# Patient Record
Sex: FEMALE | Race: WHITE | Employment: UNEMPLOYED | URBAN - METROPOLITAN AREA
[De-identification: names, ages, dates, MRNs, and addresses within clinical notes are randomized per-mention and may not be internally consistent; named-entity substitution may affect disease eponyms.]

---

## 2017-01-11 ENCOUNTER — OFFICE VISIT (OUTPATIENT)
Dept: ENDOCRINOLOGY | Age: 52
End: 2017-01-11

## 2017-01-11 VITALS
WEIGHT: 242.8 LBS | SYSTOLIC BLOOD PRESSURE: 145 MMHG | HEIGHT: 66 IN | HEART RATE: 94 BPM | DIASTOLIC BLOOD PRESSURE: 88 MMHG | BODY MASS INDEX: 39.02 KG/M2

## 2017-01-11 DIAGNOSIS — E03.9 HYPOTHYROIDISM (ACQUIRED): ICD-10-CM

## 2017-01-11 DIAGNOSIS — D50.9 IRON DEFICIENCY ANEMIA, UNSPECIFIED IRON DEFICIENCY ANEMIA TYPE: ICD-10-CM

## 2017-01-11 DIAGNOSIS — R82.994 HYPERCALCIURIA: ICD-10-CM

## 2017-01-11 DIAGNOSIS — M85.80 OSTEOPENIA: Primary | ICD-10-CM

## 2017-01-11 NOTE — PROGRESS NOTES
Chief Complaint   Patient presents with    Other     h/o stress fractures    Other     pcp and pharmacy confirmed     History of Present Illness: Basilio Fernández is a 45 y.o. female who is a new patient for stress fractures. Was referred by Dr. Consuelo Corcoran. Her mother is Julien Canchola who is a patient of mine. Has a history of seizures that started at age 14 and was put on dilantin at that time and continued until age 20 when she had depakote added and continued on both meds until a few years ago when she was taken off the dilantin but does still remain on depakote. Hasn't had a seizure in 28 years but she and Dr. Mcmillan have just stayed on this because at this point it's keeping her seizure free and she isn't having side effects. Started going through perimenopause around age 53-51 and was found to have severe anemia and hypothyroidism. She was having a lot of hair loss at the time. She was started on iron and levothyroxine at that time and had continued taking both of these meds until 2-3 weeks ago decided to stop the iron supplements but had only been taking them 2x/week recently. Her Hgb was 14 in 6/16 while on this. Started noticing that it felt like the bones in her feet felt like they were being crushed and saw Dr. Víctor De La Rosa and was found to have a stress fracture in her right foot and wore a boot for a month but then had another fracture after this. Never needed surgery but stopped wearing the boot as it was causing knee pain. Has not had any other recurrence of stress fractures but thinks her left foot could have suffered a fracture but she hit this against the wall and her toes turned purple and black doing this on [place] eve but hasn't had this x-rayed. No other fractures in any other part of her body.   Was taking calcium supplements 1 tab bid for a few years until she had a 24 hour urine done by Dr. Víctor De La Rosa in 10/16 that showed her value was high at 495 (100-300) and decided to stop the calcium supplements at that time. Was at one point taking a mvi intermittently but never consistently but not on this currently. Does take 1000 of vitamin D3 daily. Her vitamin D was normal at 38 in 10/16 and her PTH was normal at 30. Recalls having a bone density scan at Dr. Phyllis Gonzalez a few years ago when she was having these fractures and was told she had osteopenia. I requested this record. States Dr. Glenn Hampton put her on once a week alendronate and she thinks its the 70 mg dose but will confirm. Hasn't had a repeat scan on treatment. Takes this properly at least 60 min before food with just water and doesn't lie down for 30 min. Has gotten this faithfully every week since starting but may not always get the same day each week. No GERD or dysphagia or nausea. Does sometimes get some bone pain in the shins that happens 1-2 times a week and then none for a month. Quit smoking 14 years ago but does have a 30 pack year history. No ETOH. May need a course of prednisone 10 mg tabs for 5 days 1-2 times a year for her sinus disease. No PPI. Her TSH was 2.019 in . Has high cholesterol and was told she had some blockages on a heart scan by Dr. Rekha Luque and was recommended to take lipitor but she didn't want to due to concern for side effects. Her weight is up about 50 lbs over the past 4 years despite the same dose of depakote and this coincides with menopause but is only up 8 lbs since 3/16 at Dr. Aamir Aden office. Tends to be hot natured. Bowel are regular. Some hair loss and brittle nails. Some dry skin. Energy is 5/10.     Past Medical History   Diagnosis Date    Arthritis     Hyperlipidemia     Hypothyroidism (acquired)     Iron deficiency anemia     Seizures (HCC)     Unspecified epilepsy without mention of intractable epilepsy ([facility] [address]      Past Surgical History   Procedure Laterality Date    Hx  section       Current Outpatient Prescriptions   Medication Sig    BIOTIN, BULK, by Does Not Apply route daily.  divalproex DR (DEPAKOTE) 250 mg tablet Take 3 tabs in morning, 2 in the afternoon, and 3 tabs at night    clonazePAM (KLONOPIN) 0.5 mg tablet Take 1 Tab by mouth every eight (8) hours as needed (seizure).  alendronate (FOSAMAX) 70 mg tablet Take 70 mg by mouth every Wednesday.  Cholecalciferol, Vitamin D3, (VITAMIN D3) 1,000 unit cap Take 1,000 Units by mouth daily.  levothyroxine (SYNTHROID) 75 mcg tablet Take  by mouth Daily (before breakfast). No current facility-administered medications for this visit. Allergies   Allergen Reactions    Macrobid [Nitrofurantoin Monohyd/M-Cryst] Other (comments)     headaches     Family History   Problem Relation Age of Onset    Other Mother      fibromyalgia, sarcoidosis    Thyroid Disease Mother      hypothyroidism    Other Father 27     car accident    Heart Disease Maternal Grandmother     Cancer Maternal Grandmother      kidney    Cancer Maternal Grandfather      colon     Social History     Social History    Marital status:      Spouse name: N/A    Number of children: N/A    Years of education: N/A     Occupational History    Not on file. Social History Main Topics    Smoking status: Former Smoker     Packs/day: 1.50     Years: 19.00     Quit date: 1/11/2003    Smokeless tobacco: Not on file    Alcohol use No    Drug use: No    Sexual activity: Not on file     Other Topics Concern    Not on file     Social History Narrative    Lives in the [address] [place] [address] with . Has a grown son. Not currently working. Used to work as a .  Has a dog who is ill that she cares for.      Review of Systems:  - Constitutional Symptoms: no fevers, chills, (+) weight gain as above  - Eyes: no blurry vision or double vision  - Cardiovascular: no chest pain or palpitations  - Respiratory: no cough or shortness of breath aside from with a cold  - Gastrointestinal: no dysphagia or abdominal pain  - Musculoskeletal: (+) joint pains and shin pains  - Integumentary: no rashes  - Neurological: rare numbness, tingling, occ sinus headaches  - Psychiatric: no depression, some anxiety  - Endocrine: (+) heat intolerance, no polyuria or polydipsia    Physical Examination:  Blood pressure 145/88, pulse 94, height 5' 6\" (1.676 m), weight 242 lb 12.8 oz (110.1 kg). - General: pleasant, no distress, good eye contact  - HEENT: no exopthalmos, no periorbital edema, no scleral/conjunctival injection, EOMI, no lid lag or stare  - Neck: supple, small goiter, no masses, lymph nodes, or carotid bruits, no supraclavicular or dorsocervical fat pads  - Cardiovascular: regular, normal rate, normal S1 and S2, no murmurs/rubs/gallops   - Respiratory: clear to auscultation bilaterally  - Gastrointestinal: soft, nontender, nondistended, no masses, no hepatosplenomegaly  - Musculoskeletal: no proximal muscle weakness in upper or lower extremities  - Integumentary: no acanthosis nigricans, no abdominal striae, no rashes, no edema  - Neurological: reflexes 2+ at biceps, no tremor  - Psychiatric: normal mood and affect    Data Reviewed:   - labs as above      Assessment/Plan:   1. Osteopenia: She was apparently diagnosed with this a few years ago based on DXA at Dr. Marian Bowden office and started developing stress fractures in her feet. I have requested her scan for review. She has risk factors for low bone density of an almost 30 pack year history of smoking, chronic anti-seizure meds with dilantin and depakote, intermittent courses of prednisone 1-2 times a year for sinus issues and also being in a post-menopausal state. She was started on alendronate 70 mg weekly by PCP and has not had a repeat DXA to see the effect of this but has not had any recurrence of fractures. - obtain old DXA and likely plan for repeat DXA this spring  - cont alendronate 70 mg weekly  - check CBC w/o diff and cmp for other causes of bone loss      2. Hypothyroidism (acquired): TSH 2.019 in 6/16 on levothyroxine 75 mcg daily but has gained 8 lbs since then. - cont levothyroxine 75 mcg daily until labs are back  - check TSH and free T4 today      3. Hypercalciuria: was found to have a 24 hour urine calcium of 495 in 10/16 by Dr. Pedro Gil. She decided to stop her calcium supplements at that time. Will repeat her 24 hour urine now that she is off the supplements to see if she is wasting calcium as another potential cause of low bone density and if so will treat with hctz. - collect 24 hour urine for calcium now      4. Iron deficiency anemia, unspecified iron deficiency anemia type: Hgb was 14 in 6/16 while taking iron 2x/week and stopped a few weeks ago. - check CBC w/o diff now      Patient Instructions   1) Please call [phone] to reset your [employer] password. Look at your bottle of alendronate and if it's not the 70 mg dose, let me know. 2) I will obtain your bone density scan for review and likely we will arrange for a repeat scan to see the effect of the alendronate over the past few years. 3) Your 24 hour urine calcium was high in 10/16 but you were taking calcium supplements at that time. Now that you are off them, I would like to repeat this to see if you truly have a condition called hypercalciuria where your body can abnormally waste calcium in the urine and make your bones weaker. Treatment is using a pill called hctz that helps your kidneys reabsorb calcium. To collect the 24 hour urine properly, the morning you start the collection, let the first void go into the toilet and then collect every time you go over the next 24 hours and keep the jug in the refrigerator. The next morning, collect the first void and this will complete your sample and then bring the jug to the lab that day. 4) I will repeat your thyroid tests to see if you need a change in your dose of levothyroxine.     5) I will repeat your hemoglobin off the iron to make sure this is still normal.    6) I will send you a message through [employer] with your lab results to determine a plan. Follow-up Disposition:  Return for to be determined based on lab results. Copy sent to:  Dr. Kandy Moraes via [facility]  Dr. Kathrin Suresh    Lab follow up: 1/14/17  Component      Latest Ref Rng & Units 1/11/2017 1/11/2017 1/11/2017 1/11/2017           1:28 PM  1:28 PM  1:28 PM  1:28 PM   Glucose      65 - 99 mg/dL   81    BUN      6 - 24 mg/dL   17    Creatinine      0.57 - 1.00 mg/dL   0.57    GFR est non-AA      >59 mL/min/1.73   107    GFR est AA      >59 mL/min/1.73   123    BUN/Creatinine ratio      9 - 23   30 (H)    Sodium      134 - 144 mmol/L   141    Potassium      3.5 - 5.2 mmol/L   4.0    Chloride      96 - 106 mmol/L   100    CO2      18 - 29 mmol/L   23    Calcium      8.7 - 10.2 mg/dL   9.5    Protein, total      6.0 - 8.5 g/dL   7.3    Albumin      3.5 - 5.5 g/dL   4.2    GLOBULIN, TOTAL      1.5 - 4.5 g/dL   3.1    A-G Ratio      1.1 - 2.5   1.4    Bilirubin, total      0.0 - 1.2 mg/dL   0.3    Alk. phosphatase      39 - 117 IU/L   69    AST      0 - 40 IU/L   43 (H)    ALT      0 - 32 IU/L   22    WBC      3.4 - 10.8 x10E3/uL    8.4   RBC      3.77 - 5.28 x10E6/uL    4.25   HGB      11.1 - 15.9 g/dL    14.1   HCT      34.0 - 46.6 %    41.4   MCV      79 - 97 fL    97   MCH      26.6 - 33.0 pg    33.2 (H)   MCHC      31.5 - 35.7 g/dL    34.1   RDW      12.3 - 15.4 %    13.3   PLATELET      901 - 928 x10E3/uL    239   T4, Free      0.82 - 1.77 ng/dL  1.20     TSH      0.450 - 4.500 uIU/mL 2.790        Sent her the following message through [employer]:  TSH is a thyroid test.  Your level is 2.79 which is normal but slightly above goal of 0.5-2.0. This test goes opposite of your thyroid dose and suggests your dose of levothyroxine is not enough.   I will increase your dose to 88 mcg daily and have sent a new prescription to your local pharmacy.  ======  Your CBC (complete blood count) including your hemoglobin (red blood cell count) was normal so you have no evidence of anemia and it's fine to stay off iron at this point.  ======  BUN and creatinine are markers of kidney function. Your values are normal.  ======  Your electrolytes (sodium, potassium, and calcium) are all normal.  Your glucose (blood sugar) is normal.  ======  ALT and AST are markers of liver function. Your AST is just barely abnormal and I'm not concerned about this.  =======  I received your bone density report from November 2012 that showed your T-score (your bone density compared to a 27year old) at your lumbar spine was -1.2 which was consistent with osteopenia (mild bone loss), -0.4 at the left total hip which was normal and -1.4 at the left femoral neck which was also consistent with osteopenia. I will await the results of your 24 hour urine to determine a plan of what to do with your alendronate but I think we should repeat your next bone density now so we can see your progress over the past 4 years. I will send an order to [facility] on Monday and will have [facility] call you to set this up. Lab follow up: 1/15/17  Component      Latest Ref Rng & Units 1/13/2017           7:15 AM   Calcium,urine mg/dL      Not Estab. mg/dL 24.1   Calcium mg/24 hr      100.0 - 300.0 mg/24 hr 361.5 (H)     Sent her the following message through [employer]:  Your 24 hour urine is still elevated at 361 though down from 495 in October. It's possible that you are losing calcium in the urine as a reason for your weaker bones and I think you would benefit from starting a low dose of hctz 12.5 mg once daily in the morning to help you reabsorb calcium from the urine. This will be ready for  at the pharmacy today.   After we get your repeat bone density scan results back, I'll be in touch with a final plan about what to do with the alendronate and when to come back and see me.    Addendum: 1/31/17    Received her DXA results done on 1/26/17 that showed the following:  - L-spine: T-score -1.0 (2.3% increase since 2012)  - left total hip: T-score -0.1 (3% increase since 2012)  - left femoral neck: T-score -0.9 (8.6% increase since 2012)    Sent her the following message through [id number] and called her with results and a plan:    1) Your bone density scan shows that your spine and both your left total hip and left femoral neck T-scores are all -1.0 or greater which means that all 3 sites are currently in the normal range and you no longer have any evidence of osteopenia (mild bone loss) like you did in 2010. You have had a 2.3% increase in bone density at your spine, a 3% increase at your left total hip and an 8.6% increase at your left femoral neck. All 3 of these changes are considered statistically significant. I will mail you a copy of your bone density report. 2) Because your bone density is now in the normal range after 4 years of treatment with alendronate, I would like to stop the alendronate at this time and see how your bone density does over the next 2 years without any medication. There is a risk of atypical fractures of the leg bone with prolonged treatment with alendronate and given your bone density is now in the normal range, I think the potential risk of continuing treatment outweighs the potential benefit. 3) Please continue taking hctz 12.5 mg daily. I will have the office call you to arrange a follow up appointment in 4 months and will ask that you repeat a 24 hour urine for calcium the week prior to the visit so we can see the effect of this medication. I will mail you a lab slip about 3-4 weeks before your next visit to have your urine repeated the week prior to your next visit.

## 2017-01-11 NOTE — PATIENT INSTRUCTIONS
1) Please call [phone] to reset your [employer] password. Look at your bottle of alendronate and if it's not the 70 mg dose, let me know. 2) I will obtain your bone density scan for review and likely we will arrange for a repeat scan to see the effect of the alendronate over the past few years. 3) Your 24 hour urine calcium was high in 10/16 but you were taking calcium supplements at that time. Now that you are off them, I would like to repeat this to see if you truly have a condition called hypercalciuria where your body can abnormally waste calcium in the urine and make your bones weaker. Treatment is using a pill called hctz that helps your kidneys reabsorb calcium. To collect the 24 hour urine properly, the morning you start the collection, let the first void go into the toilet and then collect every time you go over the next 24 hours and keep the jug in the refrigerator. The next morning, collect the first void and this will complete your sample and then bring the jug to the lab that day. 4) I will repeat your thyroid tests to see if you need a change in your dose of levothyroxine. 5) I will repeat your hemoglobin off the iron to make sure this is still normal.    6) I will send you a message through [employer] with your lab results to determine a plan.

## 2017-01-11 NOTE — MR AVS SNAPSHOT
Visit Information Date & Time Provider Department Dept. Phone Encounter #  
 1/11/2017 10:50 AM Tello Rojas MD [facility] [id number] Follow-up Instructions Return for to be determined based on lab results. Upcoming Health Maintenance Date Due Hepatitis C Screening 1965 DTaP/Tdap/Td series (1 - Tdap) 5/20/1985 PAP AKA CERVICAL CYTOLOGY 5/20/1985 BREAST CANCER SCRN MAMMOGRAM 5/20/2014 FOBT Q 1 YEAR AGE 50-75 5/20/2014 INFLUENZA AGE 9 TO ADULT 8/1/2016 Allergies as of 1/11/2017  Review Complete On: 1/11/2017 By: Tello Rojas MD  
  
 Severity Noted Reaction Type Reactions Macrobid [Nitrofurantoin Monohyd/m-cryst]  05/06/2013   Intolerance Other (comments)  
 headaches Current Immunizations  Never Reviewed No immunizations on file. Not reviewed this visit You Were Diagnosed With   
  
 Codes Comments Osteopenia    -  Primary ICD-10-CM: M85.80 ICD-9-CM: 733.90 Hypothyroidism (acquired)     ICD-10-CM: E03.9 ICD-9-CM: 244.9 Hypercalciuria     ICD-10-CM: E83.50 ICD-9-CM: 275.40 Iron deficiency anemia, unspecified iron deficiency anemia type     ICD-10-CM: D50.9 ICD-9-CM: 280.9 Vitals BP Pulse Height(growth percentile) Weight(growth percentile) BMI OB Status 145/88 (BP 1 Location: Left arm, BP Patient Position: Sitting) 94 5' 6\" (1.676 m) 242 lb 12.8 oz (110.1 kg) 39.19 kg/m2 Menopause Smoking Status Former Smoker Vitals History BMI and BSA Data Body Mass Index Body Surface Area  
 39.19 kg/m 2 2.26 m 2 Preferred Pharmacy Pharmacy Name Phone [facility] [address] [facility] [place], [address] [phone] Your Updated Medication List  
  
   
This list is accurate as of: 1/11/17 12:41 PM.  Always use your most recent med list.  
  
  
  
  
 alendronate 70 mg tablet Commonly known as:  FOSAMAX Take 70 mg by mouth every Wednesday. BIOTIN (BULK)  
by Does Not Apply route daily. clonazePAM 0.5 mg tablet Commonly known as:  Nica Donald Take 1 Tab by mouth every eight (8) hours as needed (seizure). divalproex  mg tablet Commonly known as:  DEPAKOTE Take 3 tabs in morning, 2 in the afternoon, and 3 tabs at night SYNTHROID 75 mcg tablet Generic drug:  levothyroxine Take  by mouth Daily (before breakfast). VITAMIN D3 1,000 unit Cap Generic drug:  cholecalciferol Take 1,000 Units by mouth daily. We Performed the Following CALCIUM, UR, 24 HR [82260 CPT(R)] CBC W/O DIFF [89257 CPT(R)] METABOLIC PANEL, COMPREHENSIVE [80165 CPT(R)] T4, FREE [id number] CPT(R)] TSH 3RD GENERATION [53016 CPT(R)] Follow-up Instructions Return for to be determined based on lab results. Patient Instructions 1) Please call [phone] to reset your [employer] password. Look at your bottle of alendronate and if it's not the 70 mg dose, let me know. 2) I will obtain your bone density scan for review and likely we will arrange for a repeat scan to see the effect of the alendronate over the past few years. 3) Your 24 hour urine calcium was high in 10/16 but you were taking calcium supplements at that time. Now that you are off them, I would like to repeat this to see if you truly have a condition called hypercalciuria where your body can abnormally waste calcium in the urine and make your bones weaker. Treatment is using a pill called hctz that helps your kidneys reabsorb calcium. To collect the 24 hour urine properly, the morning you start the collection, let the first void go into the toilet and then collect every time you go over the next 24 hours and keep the jug in the refrigerator. The next morning, collect the first void and this will complete your sample and then bring the jug to the lab that day. 4) I will repeat your thyroid tests to see if you need a change in your dose of levothyroxine. 5) I will repeat your hemoglobin off the iron to make sure this is still normal. 
 
6) I will send you a message through [employer] with your lab results to determine a plan. Introducing [facility]! Dear Hannah Morales: Thank you for requesting a [employer] account. Our records indicate that you already have an active [employer] account. You can access your account anytime at [email or web address] Did you know that you can access your hospital and ER discharge instructions at any time in [employer]? You can also review all of your test results from your hospital stay or ER visit. Additional Information If you have questions, please visit the Frequently Asked Questions section of the [employer] website at [email or web address] Remember, [employer] is NOT to be used for urgent needs. For medical emergencies, dial 911. Now available from your iPhone and Android! Please provide this summary of care documentation to your next provider. Your primary care clinician is listed as Christian Hughes. If you have any questions after today's visit, please call [phone].

## 2017-01-12 ENCOUNTER — TELEPHONE (OUTPATIENT)
Dept: ENDOCRINOLOGY | Age: 52
End: 2017-01-12

## 2017-01-12 LAB
ALBUMIN SERPL-MCNC: 4.2 G/DL (ref 3.5–5.5)
ALBUMIN/GLOB SERPL: 1.4 {RATIO} (ref 1.1–2.5)
ALP SERPL-CCNC: 69 IU/L (ref 39–117)
ALT SERPL-CCNC: 22 IU/L (ref 0–32)
AST SERPL-CCNC: 43 IU/L (ref 0–40)
BILIRUB SERPL-MCNC: 0.3 MG/DL (ref 0–1.2)
BUN SERPL-MCNC: 17 MG/DL (ref 6–24)
BUN/CREAT SERPL: 30 (ref 9–23)
CALCIUM SERPL-MCNC: 9.5 MG/DL (ref 8.7–10.2)
CHLORIDE SERPL-SCNC: 100 MMOL/L (ref 96–106)
CO2 SERPL-SCNC: 23 MMOL/L (ref 18–29)
CREAT SERPL-MCNC: 0.57 MG/DL (ref 0.57–1)
ERYTHROCYTE [DISTWIDTH] IN BLOOD BY AUTOMATED COUNT: 13.3 % (ref 12.3–15.4)
GLOBULIN SER CALC-MCNC: 3.1 G/DL (ref 1.5–4.5)
GLUCOSE SERPL-MCNC: 81 MG/DL (ref 65–99)
HCT VFR BLD AUTO: 41.4 % (ref 34–46.6)
HGB BLD-MCNC: 14.1 G/DL (ref 11.1–15.9)
MCH RBC QN AUTO: 33.2 PG (ref 26.6–33)
MCHC RBC AUTO-ENTMCNC: 34.1 G/DL (ref 31.5–35.7)
MCV RBC AUTO: 97 FL (ref 79–97)
PLATELET # BLD AUTO: 239 X10E3/UL (ref 150–379)
POTASSIUM SERPL-SCNC: 4 MMOL/L (ref 3.5–5.2)
PROT SERPL-MCNC: 7.3 G/DL (ref 6–8.5)
RBC # BLD AUTO: 4.25 X10E6/UL (ref 3.77–5.28)
SODIUM SERPL-SCNC: 141 MMOL/L (ref 134–144)
T4 FREE SERPL-MCNC: 1.2 NG/DL (ref 0.82–1.77)
TSH SERPL DL<=0.005 MIU/L-ACNC: 2.79 UIU/ML (ref 0.45–4.5)
WBC # BLD AUTO: 8.4 X10E3/UL (ref 3.4–10.8)

## 2017-01-12 NOTE — TELEPHONE ENCOUNTER
----- Message from Kandi Camejo sent at 1/12/2017  9:50 AM EST -----  Regarding: lab slip  Contact: [phone]  1/12/2017  9:50 AM      Ms. Townsend called stating that she have questions regarding her lab slip, please call [phone] when you have a moment. Thanks   -----      Pt called and was concerned about the different dates on her Health Maint. She stated that they were not dated right. I informed her of the things we handled as a specialist but informed her that the other items are done according to her pcp or a provider who is treating in that particular area within [facility]. I informed her that I would let Dr. Jerson Mcneal know. She stated that she is being treated for a stress fracture.

## 2017-01-14 RX ORDER — LEVOTHYROXINE SODIUM 88 UG/1
88 TABLET ORAL
Qty: 90 TAB | Refills: 3 | Status: SHIPPED | OUTPATIENT
Start: 2017-01-14 | End: 2017-11-17 | Stop reason: ALTCHOICE

## 2017-01-15 LAB
CALCIUM 24H UR-MCNC: 24.1 MG/DL
CALCIUM 24H UR-MRATE: 361.5 MG/24 HR (ref 100–300)

## 2017-01-15 RX ORDER — HYDROCHLOROTHIAZIDE 12.5 MG/1
12.5 TABLET ORAL DAILY
Qty: 30 TAB | Refills: 11 | Status: SHIPPED | OUTPATIENT
Start: 2017-01-15 | End: 2017-05-12 | Stop reason: SDUPTHER

## 2017-01-16 ENCOUNTER — TELEPHONE (OUTPATIENT)
Dept: ENDOCRINOLOGY | Age: 52
End: 2017-01-16

## 2017-01-16 NOTE — TELEPHONE ENCOUNTER
----- Message from Amanda Vee MD sent at 1/16/2017  8:09 AM EST -----  Please call pt to let her know she has an unread message in [address] TSH is a thyroid test.  Your level is 2.79 which is normal but slightly above goal of 0.5-2.0.  This test goes opposite of your thyroid dose and suggests your dose of levothyroxine is not enough.  I will increase your dose to 88 mcg daily and have sent a new prescription to your local pharmacy.   ======   Your CBC (complete blood count) including your hemoglobin (red blood cell count) was normal so you have no evidence of anemia and it's fine to stay off iron at this point.   ======   BUN and creatinine are markers of kidney function.  Your values are normal.   ======   Your electrolytes (sodium, potassium, and calcium) are all normal.  Your glucose (blood sugar) is normal.   ======   ALT and AST are markers of liver function.  Your AST is just barely abnormal and I'm not concerned about this.   =======   I received your bone density report from November 2012 that showed your T-score (your bone density compared to a 27year old) at your lumbar spine was -1.2 which was consistent with osteopenia (mild bone loss), -0.4 at the left total hip which was normal and -1.4 at the left femoral neck which was also consistent with osteopenia.  I will await the results of your 24 hour urine to determine a plan of what to do with your alendronate but I think we should repeat your next bone density now so we can see your progress over the past 4 years. Jordana Hogan will send an order to [facility] on Monday and will have [facility] call you to set this up.   ========  Your 24 hour urine is still elevated at 361 though down from 495 in October.  It's possible that you are losing calcium in the urine as a reason for your weaker bones and I think you would benefit from starting a low dose of hctz 12.5 mg once daily in the morning to help you reabsorb calcium from the urine.  This will be ready for  at the pharmacy today. Lauren Bejarano we get your repeat bone density scan results back, I'll be in touch with a final plan about what to do with the alendronate and when to come back and see me.   -----    Pt stated that she read the message noted above.

## 2017-01-19 ENCOUNTER — TELEPHONE (OUTPATIENT)
Dept: ENDOCRINOLOGY | Age: 52
End: 2017-01-19

## 2017-01-19 NOTE — TELEPHONE ENCOUNTER
----- Message from Mildred Partida MD sent at 1/16/2017  8:11 AM EST -----      ----- Message -----     From: Mildred Partida MD     Sent: 1/16/2017       To: Mildred Partida MD    [facility]

## 2017-01-19 NOTE — TELEPHONE ENCOUNTER
----- Message from Meera Helms MD sent at 1/16/2017  8:11 AM EST -----      ----- Message -----     From: Meera Helms MD     Sent: 1/16/2017       To: Meera Helms MD    Arrange for bone density scan at [facility]  -------      [facility]  Female, 45 y.o., 1965  Weight:   242 lb 12.8 oz (110.1 kg)  Phone:   [phone]  PCP:   Lubna Coreas MD  MRN:   [id number]  MyChart: Active  Next Appt:   None    Message  Received: 3 days ago       [facility] Helms MD  [facility]                       Previous Messages       ----- Message -----      From: Meera Helms MD      Sent: 1/16/2017        To: Meera Helms MD     Arrange for bone density scan at [facility]                     Comments        Appointment scheduled for jan 26 at 3:30  No calcium the day of  Call 24 hours prior to [address] [place] [address] 15 minutes prior with insurance data. I spoke with patient and she stated she thought I had called. [facility] and [facility] gave her the data. Pt voiced no questions at this time.

## 2017-01-31 ENCOUNTER — TELEPHONE (OUTPATIENT)
Dept: ENDOCRINOLOGY | Age: 52
End: 2017-01-31

## 2017-02-01 NOTE — TELEPHONE ENCOUNTER
----- Message from Eliz Christian sent at 1/31/2017 11:57 AM EST -----  Regarding: RE: Dr. Brenda Mcdowell  Pt notified and stated that the best number is the number is [phone].  ----- Message -----     From: Martín Tyler MD     Sent: 1/31/2017  11:39 AM       To: Eliz Christian  Subject: RE: Dr. Brenda Mcdowell                        Please let her know since this test wasn't done at [facility], it's not available for review. I just received the results and haven't had a chance to review it but will look at it tonight and give her a call to discuss after my meeting ends at 6:30pm.  Let me know what number is best to reach her after 6:30pm.  ----- Message -----     From: Eliz Christian     Sent: 1/31/2017  11:28 AM       To: Martín Tyler MD  Subject: FW: Dr. Brenda Mcdowell                            ----- Message -----     From: Sia Mortensen     Sent: 1/31/2017   9:04 AM       To: Eliz Christian  Subject: FW: Dr. Brenda Mcdowell                        Please see message below. Thank you. Yodit Bolanos   ----- Message -----     From: Shon Rodriguez     Sent: 1/31/2017   8:41 AM       To: [facility] [place]  Subject: Dr. Brenda Mcdowell                            Pt stated she is unable to pull her results of the Dexa Scan on [employer] and would like a call from the nurse to discuss the next steps. Best contact number [phone].          =============  Spoke with pt tonight and sent her a Sh[employer] message with my plan.

## 2017-02-02 ENCOUNTER — TELEPHONE (OUTPATIENT)
Dept: ENDOCRINOLOGY | Age: 52
End: 2017-02-02

## 2017-02-02 NOTE — TELEPHONE ENCOUNTER
----- Message from Mohit Malik sent at 2/2/2017  9:01 AM EST -----  Faxed note to physicians listed at the bottom of your progress note. Spoke to Ms. Townsend and scheduled her 4 month follow-up for 5/12/17 at 11:30 AM.   Thank you.        ----- Message -----     From: Libia Martines MD     Sent: 1/31/2017   9:14 PM       To: Mohit aMlik    Can you please fax (or send to inbox if listed in my note) the most recent progress note to all the doctors on the bottom. Thanks so much. Please call her to arrange a f/u appt for osteopenia in 4 months.

## 2017-04-19 ENCOUNTER — TELEPHONE (OUTPATIENT)
Dept: ENDOCRINOLOGY | Age: 52
End: 2017-04-19

## 2017-04-19 DIAGNOSIS — R82.994 HYPERCALCIURIA: Primary | ICD-10-CM

## 2017-04-19 NOTE — TELEPHONE ENCOUNTER
----- Message from Krupa Edwards MD sent at 4/19/2017 12:14 AM EDT -----      ----- Message -----     From: Krupa Edwards MD     Sent: 4/19/2017       To: Kurpa Edwards MD    Mail her a lab slip to collect a 24 hour urine for calcium    ---  Printed and mailed

## 2017-04-25 ENCOUNTER — TELEPHONE (OUTPATIENT)
Dept: ENDOCRINOLOGY | Age: 52
End: 2017-04-25

## 2017-04-25 NOTE — TELEPHONE ENCOUNTER
Patient was inquiring about her lab order for her urine. i informed her that it was mailed on last week. I also informed her that Ar[facility] Hsu has the order in the computer and they can retrieve it if needed. .  Patient also stated that she ate three shrimp and she felt like she had a reaction to eat. She would like to know if you could order labs to see if she is allergic to seafood.

## 2017-04-25 NOTE — TELEPHONE ENCOUNTER
Patient would like a call back, regarding her 24-hour urine test and getting an additional test added to her lab slip. She can be reached at:  23-00-10-20.

## 2017-04-25 NOTE — TELEPHONE ENCOUNTER
Unfortunately, I don't have any way to test for seafood allergies and she would need to see an allergist to test for this.

## 2017-04-29 LAB
CALCIUM 24H UR-MCNC: 18.4 MG/DL
CALCIUM 24H UR-MRATE: 257.6 MG/24 HR (ref 100–300)

## 2017-05-06 DIAGNOSIS — G40.209 COMPLEX PARTIAL SEIZURE EVOLVING TO GENERALIZED SEIZURE (HCC): ICD-10-CM

## 2017-05-07 ENCOUNTER — HOSPITAL ENCOUNTER (EMERGENCY)
Age: 52
Discharge: HOME | End: 2017-05-08
Attending: EMERGENCY MEDICINE | Admitting: EMERGENCY MEDICINE
Payer: COMMERCIAL

## 2017-05-07 VITALS
OXYGEN SATURATION: 98 % | RESPIRATION RATE: 18 BRPM | HEIGHT: 66 IN | WEIGHT: 247.14 LBS | SYSTOLIC BLOOD PRESSURE: 160 MMHG | DIASTOLIC BLOOD PRESSURE: 62 MMHG | BODY MASS INDEX: 39.72 KG/M2 | HEART RATE: 91 BPM | TEMPERATURE: 97.8 F

## 2017-05-07 DIAGNOSIS — R73.9 HYPERGLYCEMIA: ICD-10-CM

## 2017-05-07 DIAGNOSIS — H53.8 BLURRY VISION, LEFT EYE: Primary | ICD-10-CM

## 2017-05-07 LAB
GLUCOSE BLD STRIP.AUTO-MCNC: 143 MG/DL (ref 65–100)
SERVICE CMNT-IMP: ABNORMAL

## 2017-05-07 PROCEDURE — 82962 GLUCOSE BLOOD TEST: CPT

## 2017-05-07 PROCEDURE — 99284 EMERGENCY DEPT VISIT MOD MDM: CPT

## 2017-05-07 PROCEDURE — 74011000250 HC RX REV CODE- 250: Performed by: EMERGENCY MEDICINE

## 2017-05-07 RX ORDER — TETRACAINE HYDROCHLORIDE 5 MG/ML
1 SOLUTION OPHTHALMIC
Status: COMPLETED | OUTPATIENT
Start: 2017-05-07 | End: 2017-05-07

## 2017-05-07 RX ORDER — DIVALPROEX SODIUM 250 MG/1
TABLET, DELAYED RELEASE ORAL
Qty: 720 TAB | Refills: 2 | Status: SHIPPED | OUTPATIENT
Start: 2017-05-07 | End: 2018-01-28 | Stop reason: SDUPTHER

## 2017-05-07 RX ADMIN — FLUORESCEIN SODIUM 1 STRIP: 1 STRIP OPHTHALMIC at 23:26

## 2017-05-07 RX ADMIN — TETRACAINE HYDROCHLORIDE 1 DROP: 5 SOLUTION OPHTHALMIC at 23:25

## 2017-05-08 NOTE — ED PROVIDER NOTES
HPI Comments: Lashawn Menjivar is a 45 y.o. female, who presents ambulatory to the ED c/o new onset L eye blurred vision after waking up x 2100 this evening. Pt reports additional burning, redness, and watery eyes. She notes her sxs resolved without intervention ~30 minutes after onset, but became concerned prompting her visit to the ED this evening. Pt endorses using visual aids at baseline. She notes a hx of sxs ~3 weeks ago. Pt states she followed up with an ophthalmologist where she had a reportedly normal exam. Pt notes adherence with her rx'd Depakote, HCTZ, and Levothyroxine. Pt specifically denies any recent head trauma, fever, chills, HA, photophobia, ear pain, jaw pain, abd pain, nausea, vomiting, or diarrhea. PCP: Ismael Lockwood MD  Oph: Saba Bernard, OD    PMHx: Significant for arthritis, seizures, hypothyroidism, osteopenia  PSHx: Significant for   Social Hx: -tobacco (former 6740), -EtOH, -Illicit Drugs    There are no other complaints, changes, or physical findings at this time. The history is provided by the patient. Past Medical History:   Diagnosis Date    Arthritis     Hyperlipidemia     Hypothyroidism (acquired)     Iron deficiency anemia     Osteopenia 2013    Seizures (HCC)     Unspecified epilepsy without mention of intractable epilepsy        Past Surgical History:   Procedure Laterality Date    HX  SECTION           Family History:   Problem Relation Age of Onset    Other Mother      fibromyalgia, sarcoidosis    Thyroid Disease Mother      hypothyroidism    Other Father 27     car accident    Heart Disease Maternal Grandmother     Cancer Maternal Grandmother      kidney    Cancer Maternal Grandfather      colon       Social History     Social History    Marital status:      Spouse name: N/A    Number of children: N/A    Years of education: N/A     Occupational History    Not on file.      Social History Main Topics    Smoking status: Former Smoker     Packs/day: 1.50     Years: 19.00     Quit date: 1/11/2003    Smokeless tobacco: Not on file    Alcohol use No    Drug use: No    Sexual activity: Not on file     Other Topics Concern    Not on file     Social History Narrative    Lives in the [address] [place] [address] with . Has a grown son. Not currently working. Used to work as a .  Has a dog who is ill that she cares for. ALLERGIES: Macrobid [nitrofurantoin monohyd/m-cryst]    Review of Systems   Constitutional: Negative. Negative for chills and fever. HENT: Negative for ear pain, facial swelling, hearing loss and sore throat. Eyes: Positive for pain (burning), redness and visual disturbance. Negative for photophobia, discharge and itching. Respiratory: Negative. Negative for shortness of breath. Cardiovascular: Negative for chest pain, palpitations and leg swelling. Gastrointestinal: Negative for abdominal pain, diarrhea, nausea and vomiting. Endocrine: Negative. Genitourinary: Negative. Negative for difficulty urinating, dysuria and hematuria. Musculoskeletal: Negative. Skin: Negative. Allergic/Immunologic: Negative. Neurological: Negative. Psychiatric/Behavioral: Negative for suicidal ideas. All other systems reviewed and are negative. Vitals:    05/07/17 2136 05/07/17 2320 05/07/17 2330   BP: 182/87 (!) 137/92 160/62   Pulse: (!) 110 96 91   Resp: 18 20 18   Temp: 97.8 °F (36.6 °C)     SpO2: 100% 98% 98%   Weight: 112.1 kg (247 lb 2.2 oz)     Height: 5' 6\" (1.676 m)              Physical Exam   Nursing note and vitals reviewed.   General appearance - overweight, well appearing, and in no distress  Eyes - pupils equal and reactive, extraocular eye movements intact, normal fundi  ENT - mucous membranes moist, pharynx normal without lesions  Neck - supple, no significant adenopathy; non-tender to palpation  Chest - clear to auscultation, no wheezes, rales or rhonchi; non-tender to palpation  Heart - normal rate and regular rhythm, S1 and S2 normal, no murmurs noted  Abdomen - soft, nontender, nondistended, no masses or organomegaly  Musculoskeletal - no joint tenderness, deformity or swelling; normal ROM  Extremities - peripheral pulses normal, no pedal edema  Skin - normal coloration and turgor, no rashes  Neurological - alert, oriented x3, normal speech, no focal findings or movement disorder noted  Written by Lukas Harrell ED Scribe, as dictated by Jarrod Hines MD    MDM  Number of Diagnoses or Management Options  Blurry vision, left eye:   Hyperglycemia:   Diagnosis management comments: DDx: corneal abrasion, dry eyes, retinal detachment, floaters       Amount and/or Complexity of Data Reviewed  Clinical lab tests: ordered and reviewed  Review and summarize past medical records: yes    Patient Progress  Patient progress: stable      Procedures    Procedure Note - Wood's lamp exam:  11:39 PM  Performed by: Jarrod Hines MD  Pts L eye was anesthetized with tetracaine, stained with fluorescein, and examined with a Wood's lamp, using lid eversion. Foreign body: no  Fluorescein uptake: no, showing no corneal abrasions. The procedure took 1-15 minutes, and pt tolerated well. Written by Lukas Harrell ED Scribe, as dictated by Carina Haley MD    LABORATORY TESTS:  Recent Results (from the past 12 hour(s))   GLUCOSE, POC    Collection Time: 05/07/17 11:24 PM   Result Value Ref Range    Glucose (POC) 143 (H) 65 - 100 mg/dL    Performed by Meli Alexander          MEDICATIONS GIVEN:  Medications   fluorescein (FUL-JAIME) 1 mg ophthalmic strip 1 Strip (1 Strip Both Eyes Given by Provider 5/7/17 2903)   tetracaine HCl (PF) (PONTOCAINE) 0.5 % ophthalmic solution 1 Drop (1 Drop Both Eyes Given by Provider 5/7/17 6783)       IMPRESSION:  1. Blurry vision, left eye    2. Hyperglycemia        PLAN:  1. Current Discharge Medication List        2.    Follow-up Information Follow up With Details Comments Contact Info    Sha Anderson MD Call in 1 day  [address] [facility]  [phone]      [facility] EMERGENCY DEPT  If symptoms worsen [address]  [address] [facility]  [phone]        Return to ED if worse     DISCHARGE NOTE:  11:48 PM  The patient's results have been reviewed with family and/or caregiver. They verbally convey their understanding and agreement of the patient's signs, symptoms, diagnosis, treatment, and prognosis. They additionally agree to follow up as recommended in the discharge instructions or to return to the Emergency Room should the patient's condition change prior to their follow-up appointment. The family and/or caregiver verbally agrees with the care-plan and all of their questions have been answered. The discharge instructions have also been provided to the them along with educational information regarding the patient's diagnosis and a list of reasons why the patient would want to return to the ER prior to their follow-up appointment should their condition change. Written by Pankaj Francisco, ED Scribe, as dictated by Anh Valadez MD.         This note is prepared by Pankaj Francisco, acting as Scribe for MD Carina Oquendo MD : The scribe's documentation has been prepared under my direction and personally reviewed by me in its entirety. I confirm that the note above accurately reflects all work, treatment, procedures, and medical decision making performed by me. This note will not be viewable in [address]

## 2017-05-08 NOTE — ED NOTES
.Discharge instructions reviewed with patient and given to pt per MD Juliaette Crigler. Pt able to return/verbalize discharge instructions. Copy of discharge instructions given. Pt condition stable, no further complaints. Pt out of ER, accompanied by self & family. Ambulatory, steady gait. Wheelchair offered & pt declined. Patient out of ED prior to obtaining discharge vitals. Belongings & discharge paperwork out of ED with patient.

## 2017-05-08 NOTE — ED NOTES
Pt reports left eye blurry vision for about \"an hour & half since being here. I went they eye doctor and he said everything was okay. I have floater in that eye but he said that is normal. And I feel like I have dry eyes. \" Reports burning and watery eyes especially at night. Wears glasses daily. Reports sinus allergies.

## 2017-05-08 NOTE — DISCHARGE INSTRUCTIONS
Learning About High Blood Sugar  What is high blood sugar? Your body turns the food you eat into glucose (sugar), which it uses for energy. But if your body isn't able to use the sugar right away, it can build up in your blood and lead to high blood sugar. When the amount of sugar in your blood stays too high for too much of the time, you may have diabetes. Diabetes is a disease that can cause serious health problems. The good news is that lifestyle changes may help you get your blood sugar back to normal and avoid or delay diabetes. What causes high blood sugar? Sugar (glucose) can build up in your blood if you:  · Are overweight. · Have a family history of diabetes. · Take certain medicines, such as steroids. What are the symptoms? Having high blood sugar may not cause any symptoms at all. Or it may make you feel very thirsty or very hungry. You may also urinate more often than usual, have blurry vision, or lose weight without trying. How is high blood sugar treated? You can take steps to lower your blood sugar level if you understand what makes it get higher. Your doctor may want you to learn how to test your blood sugar level at home. Then you can see how illness, stress, or different kinds of food or medicine raise or lower your blood sugar level. Other tests may be needed to see if you have diabetes. How can you prevent high blood sugar? · Watch your weight. If you're overweight, losing just a small amount of weight may help. Reducing fat around your waist is most important. · Limit the amount of calories, sweets, and unhealthy fat you eat. Ask your doctor if a dietitian can help you. A registered dietitian can help you create meal plans that fit your lifestyle. · Get at least 30 minutes of exercise on most days of the week. Exercise helps control your blood sugar. It also helps you maintain a healthy weight. Walking is a good choice.  You also may want to do other activities, such as running, swimming, cycling, or playing tennis or team sports. · If your doctor prescribed medicines, take them exactly as prescribed. Call your doctor if you think you are having a problem with your medicine. You will get more details on the specific medicines your doctor prescribes. Follow-up care is a key part of your treatment and safety. Be sure to make and go to all appointments, and call your doctor if you are having problems. It's also a good idea to know your test results and keep a list of the medicines you take. Where can you learn more? Go to http://jonathan-louise.info/. Enter O108 in the search box to learn more about \"Learning About High Blood Sugar. \"  Current as of: May 23, 2016  Content Version: 11.2  © 5525-4894 [employer]. Care instructions adapted under license by [employer] (which disclaims liability or warranty for this information). If you have questions about a medical condition or this instruction, always ask your healthcare professional. [address] any warranty or liability for your use of this information. Reduced Vision: Care Instructions  Your Care Instructions    Reduced vision can be caused by many things. These include macular degeneration and glaucoma. When you can't see as well, daily life can be more challenging. But you can do some things to stay independent and keep doing the activities you enjoy. Follow-up care is a key part of your treatment and safety. Be sure to make and go to all appointments, and call your doctor if you are having problems. It's also a good idea to know your test results and keep a list of the medicines you take. How can you care for yourself at home? Use lighting  · Point lighting at what you want to see. Don't point it at your eyes. · Add lamps where you need extra lighting. · Use curtains or shades to adjust how much natural light there is.   · Use good lighting in places where you could easily fall. These include entries and stairways. Use labels  · Label things that are hard to recognize or that could be confusing. This might include medicines, spices, and foods. Use black letters on a white background. Or you can color-code the items. · Yen Arrington the positions of the temperature settings you use the most on your stove and oven. Also marielos the \"on\" and \"off\" positions. · Marielos the water temperatures you use on faucets in the kitchen and bathroom. To prevent overfilling a sink or bathtub, use waterproof markers or tape to marielos the water level you want. Avoid falls in your home  · Replace or remove any worn carpeting. Tape down or remove area rugs. · Do not wax your floors. Use nonskid, nonglare  on smooth floors. · Remove electrical cords from areas where you need to walk. Or tape them down so you won't trip on them. · Make sure furniture doesn't stick out into areas where you walk. Keep chairs pushed in under tables and desks. Keep all drawers closed. · Keep doors fully opened or fully closed. Don't leave them longterm open or shut. · Use handrails on stairways and ramps. Make sure that they go beyond the top and bottom steps. Then you won't stumble if you miss a step. Use helpful technology  · Use a magnifying lens. You can buy ones that you hold. Or you can buy ones that attach to glasses. Some have lights built in.  · If your budget allows, you may want to think about a video magnifier system. These systems can make print, pictures, or other items bigger on a screen. · If you have a computer:  ¨ Try to adjust the display. You can often change how big the text and pictures appear. Then they will be easier to see and read. ¨ You may want to try special software. Some software can recognize spoken commands or change dictated speech into text. Other software allows computers to speak text and read documents. · Use large-print items.  These include books, newspapers, magazines, and medicine labels. You can also listen to recordings of books. · Think about using devices made for people with low vision. Examples are clocks and watches that announce the time. There are also clocks, telephones, and calculators with extra-large buttons. Be safe while you stay active  · Ask your doctor what physical activities are safe for you. If you bend, lift things, or move fast, it may affect your health or vision. · Ask a friend to read you the instructions for a new exercise and to check your technique. · Walk with someone who can help look for things that may be a danger. · If you swim laps, use a pool that has ropes between the lanes. When should you call for help? Call your doctor now or seek immediate medical care if:  · You have a sudden change in vision. Watch closely for changes in your health, and be sure to contact your doctor if you have new changes in either eye. Where can you learn more? Go to http://jonathan-louise.info/. Enter X275 in the search box to learn more about \"Reduced Vision: Care Instructions. \"  Current as of: May 23, 2016  Content Version: 11.2  © 0875-4789 [employer]. Care instructions adapted under license by [employer] (which disclaims liability or warranty for this information). If you have questions about a medical condition or this instruction, always ask your healthcare professional. [address] any warranty or liability for your use of this information.

## 2017-05-12 ENCOUNTER — OFFICE VISIT (OUTPATIENT)
Dept: ENDOCRINOLOGY | Age: 52
End: 2017-05-12

## 2017-05-12 VITALS
HEIGHT: 66 IN | DIASTOLIC BLOOD PRESSURE: 84 MMHG | HEART RATE: 97 BPM | SYSTOLIC BLOOD PRESSURE: 129 MMHG | BODY MASS INDEX: 39.47 KG/M2 | WEIGHT: 245.6 LBS

## 2017-05-12 DIAGNOSIS — E03.9 HYPOTHYROIDISM (ACQUIRED): ICD-10-CM

## 2017-05-12 DIAGNOSIS — D50.9 IRON DEFICIENCY ANEMIA, UNSPECIFIED IRON DEFICIENCY ANEMIA TYPE: ICD-10-CM

## 2017-05-12 DIAGNOSIS — M85.80 OSTEOPENIA, UNSPECIFIED LOCATION: Primary | ICD-10-CM

## 2017-05-12 DIAGNOSIS — R82.994 HYPERCALCIURIA: ICD-10-CM

## 2017-05-12 RX ORDER — ROSUVASTATIN CALCIUM 5 MG/1
5 TABLET, COATED ORAL
COMMUNITY
Start: 2017-05-08 | End: 2017-11-17

## 2017-05-12 RX ORDER — HYDROCHLOROTHIAZIDE 12.5 MG/1
12.5 TABLET ORAL DAILY
Qty: 90 TAB | Refills: 3 | Status: SHIPPED | OUTPATIENT
Start: 2017-05-12 | End: 2018-05-21 | Stop reason: SDUPTHER

## 2017-05-12 NOTE — PROGRESS NOTES
Chief Complaint   Patient presents with    Osteoporosis     pcp and pharmacy confirmed     History of Present Illness: Davie Montemayor is a 45 y.o. female here for follow up of thyroid. Weight up 3 lbs since last visit in 1/17. Has been taking the higher dose of levothyroxine 88 mcg daily but tends to take the pill after breakfast and with coffee and has always been doing this. Hasn't missed any doses or run out. Hasn't noticed much difference in how she feels on the higher dose. Has been taking the hctz every morning and hasn't had any increase in nocturia. Still taking vitamin D daily. No new fractures. Has been off the alendronate since last visit. Still tends to stay warm but better than before. Some hair loss despite the biotin. Bowels are regular. Some dry skin. Current Outpatient Prescriptions   Medication Sig    rosuvastatin (CRESTOR) 5 mg tablet Take 5 mg by mouth nightly.  divalproex DR (DEPAKOTE) 250 mg tablet TAKE 3 TABS IN MORNING, 2 IN THE AFTERNOON, AND 3 TABS AT NIGHT    hydroCHLOROthiazide (HYDRODIURIL) 12.5 mg tablet Take 1 Tab by mouth daily.  levothyroxine (SYNTHROID) 88 mcg tablet Take 1 Tab by mouth Daily (before breakfast).  BIOTIN, BULK, by Does Not Apply route daily.  clonazePAM (KLONOPIN) 0.5 mg tablet Take 1 Tab by mouth every eight (8) hours as needed (seizure).  Cholecalciferol, Vitamin D3, (VITAMIN D3) 1,000 unit cap Take 1,000 Units by mouth daily. No current facility-administered medications for this visit. Allergies   Allergen Reactions    Macrobid [Nitrofurantoin Monohyd/M-Cryst] Other (comments)     headaches     Review of Systems:  - Cardiovascular: no chest pain  - Neurological: no tremors  - Integumentary: skin is normal    Physical Examination:  Blood pressure 129/84, pulse 97, height 5' 6\" (1.676 m), weight 245 lb 9.6 oz (111.4 kg).   - General: pleasant, no distress, good eye contact   - Neck: small goiter, no carotid bruits  - Cardiovascular: regular, normal rate, nl s1 and s2, no m/r/g   - Respiratory: clear to auscultation bilaterally   - Integumentary: skin is normal, no edema  - Neurological: reflexes 2+ at biceps, no tremors  - Psychiatric: normal mood and affect    Data Reviewed:   Component      Latest Ref Rng & Units 4/27/2017           4:00 AM   Calcium,urine mg/dL      Not Estab. mg/dL 18.4   Calcium mg/24 hr      100.0 - 300.0 mg/24 hr 257.6       Assessment/Plan:     1. Osteopenia: She was diagnosed with this based on DXA at Dr. Maldonado Self office in Nov 2012 that showed November 2012 that showed T-score at her lumbar spine was -1.2 which, -0.4 at the left total hip which was normal and -1.4 at the left femoral neck which was also consistent with osteopenia and started developing stress fractures in her feet. She has risk factors for low bone density of an almost 30 pack year history of smoking, chronic anti-seizure meds with dilantin and depakote, intermittent courses of prednisone 1-2 times a year for sinus issues and also being in a post-menopausal state. She was started on alendronate 70 mg weekly by PCP and repeat DXA in 1/17 showed:- L-spine: T-score -1.0 (2.3% increase since 2012), left total hip: T-score -0.1 (3% increase since 2012), and left femoral neck: T-score -0.9 (8.6% increase since 2012) so I stopped the alendronate as everything was back to normal at that time. - repeat DXA in 1/19      2. Hypothyroidism (acquired): TSH 2.019 in 6/16 on levothyroxine 75 mcg daily but had gained 8 lbs since then and TSH was 2.79 in 1/17 so increased her dose to 88 mcg daily but has been taking with food and coffee so will interpret labs accordingly  - cont levothyroxine 88 mcg daily until labs are back  - check TSH and free T4 today and prior to next visit      3. Hypercalciuria: was found to have a 24 hour urine calcium of 495 in 10/16 by Dr. Orlando Nguyen. She decided to stop her calcium supplements at that time.   Her vitamin D was normal at 38 in 10/16 and her PTH was normal at 30. Her repeat was still high at 361 in 1/17 so I added hctz 12.5 mg daily and down to 257 in 4/17 so will continue this dose and repeat 24 hour urine in 1 year. - cont hctz 12.5 mg daily  - collect 24 hour urine in 5/18  - check bmp today  - check bmp and vitamin D 25-OH prior to next visit      4. Iron deficiency anemia, unspecified iron deficiency anemia type: Hgb was 14 in 6/16 while taking iron 2x/week and stopped a few weeks prior to visit in 1/17 and Hgb was 14.1  - check CBC w/o diff today      Patient Instructions   1) Take Levothyroxine in the morning with just water, at least 30 minutes before breakfast and coffee and all other pills to improve absorption. 2) Your 24 hour urine calcium is back to normal so keep taking hctz 12.5 mg once daily. We'll repeat this test in one year. 3) I will send you a message through [employer] with your lab results. 4) I will send you a reminder e-mail 3-4 weeks prior to your next visit and you will have the order already in the [employer] system so you can just go sometime in the 3-7 days before the next visit to have your labs drawn. Follow-up Disposition:  Return in about 6 months (around 11/12/2017).     Copy sent to:  Dr. Aravind Hannah via [facility]  Dr. Alyssa Cantor follow up: 5/13/17  Component      Latest Ref Rng & Units 5/12/2017 5/12/2017 5/12/2017 5/12/2017          12:33 PM 12:33 PM 12:33 PM 12:33 PM   Glucose      65 - 99 mg/dL 90      BUN      6 - 24 mg/dL 18      Creatinine      0.57 - 1.00 mg/dL 0.54 (L)      GFR est non-AA      >59 mL/min/1.73 109      GFR est AA      >59 mL/min/1.73 125      BUN/Creatinine ratio      9 - 23 33 (H)      Sodium      134 - 144 mmol/L 143      Potassium      3.5 - 5.2 mmol/L 4.4      Chloride      96 - 106 mmol/L 100      CO2      18 - 29 mmol/L 26      Calcium      8.7 - 10.2 mg/dL 10.4 (H)      WBC      3.4 - 10.8 x10E3/uL  8.4     RBC      3.77 - 5.28 x10E6/uL  4.37     HGB      11.1 - 15.9 g/dL  14.2     HCT      34.0 - 46.6 %  42.0     MCV      79 - 97 fL  96     MCH      26.6 - 33.0 pg  32.5     MCHC      31.5 - 35.7 g/dL  33.8     RDW      12.3 - 15.4 %  13.6     PLATELET      406 - 116 x10E3/uL  257     TSH      0.450 - 4.500 uIU/mL    2.150   T4, Free      0.82 - 1.77 ng/dL   1.41      Sent her the following message through "[employer]:  TSH is a thyroid test.  Your level is 2.15 which is normal but still above goal of 0.5-2.0. This test goes opposite of your thyroid dose and suggests your dose of levothyroxine is not quite enough though I think it will likely be adequate if you start taking this before breakfast and coffee by 30 minutes so I will keep it the same for now.   ============  Your CBC (complete blood count) was still normal so you have no evidence of anemia and can continue to stay off the iron.  ============  BUN and creatinine are markers of kidney function. Your values are normal.  Although your creatinine is low, I'm not concerned about this as I only worry if your creatinine is high.  ============  Your calcium was slightly high since starting the hctz. Therefore, it's likely you don't need the vitamin D any longer as this can cause you to hold on to calcium so please stop this and I will repeat your vitamin D and calcium level prior to next visit off the vitamin D supplement.

## 2017-05-12 NOTE — PATIENT INSTRUCTIONS
1) Take Levothyroxine in the morning with just water, at least 30 minutes before breakfast and coffee and all other pills to improve absorption. 2) Your 24 hour urine calcium is back to normal so keep taking hctz 12.5 mg once daily. We'll repeat this test in one year. 3) I will send you a message through [email or web address] with your lab results. 4) I will send you a reminder e-mail 3-4 weeks prior to your next visit and you will have the order already in the [employer] system so you can just go sometime in the 3-7 days before the next visit to have your labs drawn.

## 2017-05-12 NOTE — MR AVS SNAPSHOT
Visit Information Date & Time Provider Department Dept. Phone Encounter #  
 5/12/2017 11:30 AM Marshal Longo [facility] [phone] [id number] Follow-up Instructions Return in about 6 months (around 11/12/2017). Upcoming Health Maintenance Date Due Hepatitis C Screening 1965 DTaP/Tdap/Td series (1 - Tdap) 5/20/1985 PAP AKA CERVICAL CYTOLOGY 5/20/1985 BREAST CANCER SCRN MAMMOGRAM 5/20/2014 FOBT Q 1 YEAR AGE 50-75 5/20/2014 INFLUENZA AGE 9 TO ADULT 8/1/2017 Allergies as of 5/12/2017  Review Complete On: 5/12/2017 By: Mar Ramirez MD  
  
 Severity Noted Reaction Type Reactions Macrobid [Nitrofurantoin Monohyd/m-cryst]  05/06/2013   Intolerance Other (comments)  
 headaches Current Immunizations  Never Reviewed No immunizations on file. Not reviewed this visit You Were Diagnosed With   
  
 Codes Comments Osteopenia, unspecified location    -  Primary ICD-10-CM: M85.80 ICD-9-CM: 733.90 Hypercalciuria     ICD-10-CM: E83.50 ICD-9-CM: 275.40 Hypothyroidism (acquired)     ICD-10-CM: E03.9 ICD-9-CM: 244.9 Iron deficiency anemia, unspecified iron deficiency anemia type     ICD-10-CM: D50.9 ICD-9-CM: 280.9 Vitals BP Pulse Height(growth percentile) Weight(growth percentile) BMI OB Status 129/84 (BP 1 Location: Left arm, BP Patient Position: Sitting) 97 5' 6\" (1.676 m) 245 lb 9.6 oz (111.4 kg) 39.64 kg/m2 Menopause Smoking Status Former Smoker Vitals History BMI and BSA Data Body Mass Index Body Surface Area  
 39.64 kg/m 2 2.28 m 2 Preferred Pharmacy Pharmacy Name Phone [facility] [address] [place] [address] [phone] Your Updated Medication List  
  
   
This list is accurate as of: 5/12/17 12:33 PM.  Always use your most recent med list.  
  
  
  
  
 BIOTIN (BULK) by Does Not Apply route daily. clonazePAM 0.5 mg tablet Commonly known as:  Valere Idler Take 1 Tab by mouth every eight (8) hours as needed (seizure). divalproex  mg tablet Commonly known as:  DEPAKOTE  
TAKE 3 TABS IN MORNING, 2 IN THE AFTERNOON, AND 3 TABS AT NIGHT  
  
 hydroCHLOROthiazide 12.5 mg tablet Commonly known as:  HYDRODIURIL Take 1 Tab by mouth daily. levothyroxine 88 mcg tablet Commonly known as:  SYNTHROID Take 1 Tab by mouth Daily (before breakfast). rosuvastatin 5 mg tablet Commonly known as:  CRESTOR Take 5 mg by mouth nightly. VITAMIN D3 1,000 unit Cap Generic drug:  cholecalciferol Take 1,000 Units by mouth daily. Prescriptions Sent to Pharmacy Refills  
 hydroCHLOROthiazide (HYDRODIURIL) 12.5 mg tablet 3 Sig: Take 1 Tab by mouth daily. Class: Normal  
 Pharmacy: [address] [place] [address] #: [phone] Route: Oral  
  
We Performed the Following CBC W/O DIFF [63554 CPT(R)] METABOLIC PANEL, BASIC [26061 CPT(R)] DC COLLECTION VENOUS BLOOD,VENIPUNCTURE [id number] CPT(R)] DC HANDLG&/OR CONVEY OF SPEC FOR TR OFFICE TO LAB [38247 CPT(R)] T4, FREE [id number] CPT(R)] TSH 3RD GENERATION [59286 CPT(R)] Follow-up Instructions Return in about 6 months (around 11/12/2017). Patient Instructions 1) Take Levothyroxine in the morning with just water, at least 30 minutes before breakfast and coffee and all other pills to improve absorption. 2) Your 24 hour urine calcium is back to normal so keep taking hctz 12.5 mg once daily. We'll repeat this test in one year. 3) I will send you a message through [employer] with your lab results.  
 
4) I will send you a reminder e-mail 3-4 weeks prior to your next visit and you will have the order already in the [employer] system so you can just go sometime in the 3-7 days before the next visit to have your labs drawn. Introducing [facility] SERVICES! Dear Sincere Bautista: Thank you for requesting a [employer] account. Our records indicate that you already have an active [employer] account. You can access your account anytime at [email or web address] Did you know that you can access your hospital and ER discharge instructions at any time in [employer]? You can also review all of your test results from your hospital stay or ER visit. Additional Information If you have questions, please visit the Frequently Asked Questions section of the [employer] website at [email or web address] Remember, [employer] is NOT to be used for urgent needs. For medical emergencies, dial 911. Now available from your iPhone and Android! Please provide this summary of care documentation to your next provider. Your primary care clinician is listed as Benji Horne. If you have any questions after today's visit, please call [phone].

## 2017-05-13 LAB
BUN SERPL-MCNC: 18 MG/DL (ref 6–24)
BUN/CREAT SERPL: 33 (ref 9–23)
CALCIUM SERPL-MCNC: 10.4 MG/DL (ref 8.7–10.2)
CHLORIDE SERPL-SCNC: 100 MMOL/L (ref 96–106)
CO2 SERPL-SCNC: 26 MMOL/L (ref 18–29)
CREAT SERPL-MCNC: 0.54 MG/DL (ref 0.57–1)
ERYTHROCYTE [DISTWIDTH] IN BLOOD BY AUTOMATED COUNT: 13.6 % (ref 12.3–15.4)
GLUCOSE SERPL-MCNC: 90 MG/DL (ref 65–99)
HCT VFR BLD AUTO: 42 % (ref 34–46.6)
HGB BLD-MCNC: 14.2 G/DL (ref 11.1–15.9)
MCH RBC QN AUTO: 32.5 PG (ref 26.6–33)
MCHC RBC AUTO-ENTMCNC: 33.8 G/DL (ref 31.5–35.7)
MCV RBC AUTO: 96 FL (ref 79–97)
PLATELET # BLD AUTO: 257 X10E3/UL (ref 150–379)
POTASSIUM SERPL-SCNC: 4.4 MMOL/L (ref 3.5–5.2)
RBC # BLD AUTO: 4.37 X10E6/UL (ref 3.77–5.28)
SODIUM SERPL-SCNC: 143 MMOL/L (ref 134–144)
T4 FREE SERPL-MCNC: 1.41 NG/DL (ref 0.82–1.77)
TSH SERPL DL<=0.005 MIU/L-ACNC: 2.15 UIU/ML (ref 0.45–4.5)
WBC # BLD AUTO: 8.4 X10E3/UL (ref 3.4–10.8)

## 2017-06-29 ENCOUNTER — TELEPHONE (OUTPATIENT)
Dept: ENDOCRINOLOGY | Age: 52
End: 2017-06-29

## 2017-06-29 NOTE — TELEPHONE ENCOUNTER
Pt notified of message per Dr. Fidelia Huynh and voiced understanding of what was read to her. She stated that the pharmacy also told her that it could be affecting her potassium. Patient wants to know if this is possible and should she be concerned.

## 2017-06-29 NOTE — TELEPHONE ENCOUNTER
She is on such a low dose that I'm not concerned about her potassium and her level was normal on her recent lab draw.

## 2017-06-29 NOTE — TELEPHONE ENCOUNTER
Patient is requesting a call back in regards to her hydrochlorothiazide. Patient stated that she is extremely thirsty and would like to know if this is a side effect. She can be reached at [phone].

## 2017-06-29 NOTE — TELEPHONE ENCOUNTER
Increase thirst could be a side effect of the hctz but given that her current dose is controlling her urine calcium, I would prefer her not to change her dose if possible and just be sure to drink extra water or use throat lozanges to help with thirst.

## 2017-07-19 ENCOUNTER — TELEPHONE (OUTPATIENT)
Dept: NEUROLOGY | Age: 52
End: 2017-07-19

## 2017-07-19 NOTE — TELEPHONE ENCOUNTER
Patient's lab levels came back and per Dr Daniel Parrish, her depakote level was 80 which is high normal and should be fine for patient.     I notified patient of the information above

## 2017-08-14 ENCOUNTER — TELEPHONE (OUTPATIENT)
Dept: NEUROLOGY | Age: 52
End: 2017-08-14

## 2017-08-14 NOTE — TELEPHONE ENCOUNTER
----- Message from Eloisa Record sent at 8/11/2017  3:24 PM EDT -----  Regarding: Dr. Giovanna Ken  Pt would like a call from Dr. Serena Donis about her pinched nerve that is running down her (R)arm and into two of her fingers,per the pt. The pt called in to schedule an appt however the next available appt is not till Feb of 2018. Pt would like to know what Dr. Serena Donis thinks she should do by sending her a message through her [employer]. I asked the pt if she would like me to schedule her for the Feb rhonda, she stated, No, she wanted to wait to see what Dr. Serena Donis suggest. Pt best contact number is [phone].

## 2017-08-15 ENCOUNTER — TELEPHONE (OUTPATIENT)
Dept: NEUROLOGY | Age: 52
End: 2017-08-15

## 2017-08-15 NOTE — TELEPHONE ENCOUNTER
----- Message from Durward Sandhoff sent at 8/14/2017  3:52 PM EDT -----  Regarding: Dr. Jose Hodge   Pt is requesting a f/up appt. Pt also states that she has a pinch nerve in her neck, right arm and 2 fingers. Best contact for patient is [phone].      Her next appt is in February 19,2018 please let her know if an appt comes available

## 2017-08-15 NOTE — TELEPHONE ENCOUNTER
I spoke with the patient and she would not like an appointment with the nurse practitioner and will wait until her yearly follow up with Dr Batsheva Ty.  If it gets worse for her, she will start with her PCP about the pain as it has been about 30 years since neurology has treated and is new for the patient

## 2017-08-15 NOTE — TELEPHONE ENCOUNTER
Advised patient that we do not keep a cancellation list, but can call back at any time to see if there are cancellations as she does not want to see the nurse practitioner

## 2017-10-18 ENCOUNTER — TELEPHONE (OUTPATIENT)
Dept: ENDOCRINOLOGY | Age: 52
End: 2017-10-18

## 2017-10-18 DIAGNOSIS — R82.994 HYPERCALCIURIA: ICD-10-CM

## 2017-10-18 DIAGNOSIS — E03.9 HYPOTHYROIDISM (ACQUIRED): ICD-10-CM

## 2017-10-18 DIAGNOSIS — M85.80 OSTEOPENIA, UNSPECIFIED LOCATION: ICD-10-CM

## 2017-10-18 DIAGNOSIS — D50.9 IRON DEFICIENCY ANEMIA, UNSPECIFIED IRON DEFICIENCY ANEMIA TYPE: ICD-10-CM

## 2017-10-18 NOTE — TELEPHONE ENCOUNTER
----- Message from Justin De La Vega MD sent at 10/18/2017 12:11 AM EDT -----      ----- Message -----     From: Justin De La Vega MD     Sent: 10/18/2017       To: Justin De La Vega MD    Send a reminder e-mail to have labs prior to next visit    ---    completed

## 2017-11-08 LAB
25(OH)D3+25(OH)D2 SERPL-MCNC: 29.8 NG/ML (ref 30–100)
BUN SERPL-MCNC: 18 MG/DL (ref 6–24)
BUN/CREAT SERPL: 34 (ref 9–23)
CALCIUM SERPL-MCNC: 10 MG/DL (ref 8.7–10.2)
CHLORIDE SERPL-SCNC: 101 MMOL/L (ref 96–106)
CO2 SERPL-SCNC: 28 MMOL/L (ref 18–29)
CREAT SERPL-MCNC: 0.53 MG/DL (ref 0.57–1)
GFR SERPLBLD CREATININE-BSD FMLA CKD-EPI: 109 ML/MIN/1.73
GFR SERPLBLD CREATININE-BSD FMLA CKD-EPI: 125 ML/MIN/1.73
GLUCOSE SERPL-MCNC: 77 MG/DL (ref 65–99)
POTASSIUM SERPL-SCNC: 4.1 MMOL/L (ref 3.5–5.2)
SODIUM SERPL-SCNC: 145 MMOL/L (ref 134–144)
T4 FREE SERPL-MCNC: 1.47 NG/DL (ref 0.82–1.77)
TSH SERPL DL<=0.005 MIU/L-ACNC: 2.02 UIU/ML (ref 0.45–4.5)

## 2017-11-17 ENCOUNTER — OFFICE VISIT (OUTPATIENT)
Dept: ENDOCRINOLOGY | Age: 52
End: 2017-11-17

## 2017-11-17 VITALS
WEIGHT: 241.6 LBS | DIASTOLIC BLOOD PRESSURE: 78 MMHG | BODY MASS INDEX: 38.83 KG/M2 | HEIGHT: 66 IN | HEART RATE: 94 BPM | SYSTOLIC BLOOD PRESSURE: 123 MMHG

## 2017-11-17 DIAGNOSIS — E03.9 HYPOTHYROIDISM (ACQUIRED): ICD-10-CM

## 2017-11-17 DIAGNOSIS — M85.80 OSTEOPENIA, UNSPECIFIED LOCATION: Primary | ICD-10-CM

## 2017-11-17 DIAGNOSIS — R82.994 HYPERCALCIURIA: ICD-10-CM

## 2017-11-17 RX ORDER — PRAVASTATIN SODIUM 20 MG/1
20 TABLET ORAL
COMMUNITY
Start: 2017-11-14 | End: 2022-09-19 | Stop reason: DRUGHIGH

## 2017-11-17 RX ORDER — LISINOPRIL 10 MG/1
10 TABLET ORAL DAILY
COMMUNITY
Start: 2017-11-15 | End: 2018-05-21

## 2017-11-17 RX ORDER — LEVOTHYROXINE SODIUM 88 UG/1
88 TABLET ORAL
Qty: 14 TAB | Refills: 0 | Status: SHIPPED | COMMUNITY
Start: 2017-11-17 | End: 2017-11-17 | Stop reason: SDUPTHER

## 2017-11-17 RX ORDER — LEVOTHYROXINE SODIUM 88 UG/1
88 TABLET ORAL
Qty: 90 TAB | Refills: 3 | Status: SHIPPED | OUTPATIENT
Start: 2017-11-17 | End: 2018-01-11

## 2017-11-17 NOTE — MR AVS SNAPSHOT
Visit Information Date & Time Provider Department Dept. Phone Encounter #  
 11/17/2017 10:30 AM Lizabeth Natarajan, [address] [facility] [phone] [id number] Follow-up Instructions Return in about 6 months (around 5/17/2018). Your Appointments 2/19/2018  3:40 PM  
Follow Up with Bhavik Simpson MD  
[facility] [address]) Appt Note: f/up routine pinch nerve in neck right arm two fingers [address] [facility], 
[address] [place] [address], [phone]  
[address] [facility], [address] [place] [address], [phone] Upcoming Health Maintenance Date Due Hepatitis C Screening 1965 DTaP/Tdap/Td series (1 - Tdap) 5/20/1985 PAP AKA CERVICAL CYTOLOGY 5/20/1985 BREAST CANCER SCRN MAMMOGRAM 5/20/2014 FOBT Q 1 YEAR AGE 50-75 5/20/2014 Influenza Age 5 to Adult 8/1/2017 Allergies as of 11/17/2017  Review Complete On: 11/17/2017 By: Lizabeth Natarajan MD  
  
 Severity Noted Reaction Type Reactions Crestor [Rosuvastatin]  11/17/2017    Myalgia Macrobid [Nitrofurantoin Monohyd/m-cryst]  05/06/2013   Intolerance Other (comments)  
 headaches Current Immunizations  Never Reviewed No immunizations on file. Not reviewed this visit Vitals BP Pulse Height(growth percentile) Weight(growth percentile) BMI OB Status 123/78 94 5' 6\" (1.676 m) 241 lb 9.6 oz (109.6 kg) 39 kg/m2 Menopause Smoking Status Former Smoker Vitals History BMI and BSA Data Body Mass Index Body Surface Area  
 39 kg/m 2 2.26 m 2 Preferred Pharmacy [facility] Name Phone [facility] [address] [place] [address] [phone] Your Updated Medication List  
  
   
This list is accurate as of: 11/17/17 11:12 AM.  Always use your most recent med list.  
  
  
  
  
 BIOTIN (BULK) by Does Not Apply route daily. CENTRUM COMPLETE PO Take  by mouth daily. clonazePAM 0.5 mg tablet Commonly known as:  Es Kava Take 1 Tab by mouth every eight (8) hours as needed (seizure). divalproex  mg tablet Commonly known as:  DEPAKOTE  
TAKE 3 TABS IN MORNING, 2 IN THE AFTERNOON, AND 3 TABS AT NIGHT  
  
 hydroCHLOROthiazide 12.5 mg tablet Commonly known as:  HYDRODIURIL Take 1 Tab by mouth daily. lisinopril 10 mg tablet Commonly known as:  Michaelyn [place] Take 10 mg by mouth daily. pravastatin 20 mg tablet Commonly known as:  PRAVACHOL Take 1 tab daily UNITHROID 88 mcg tablet Generic drug:  levothyroxine Take 1 Tab by mouth Daily (before breakfast). BRAND MEDICALLY NECESSARY Prescriptions Printed Refills UNITHROID 88 mcg tablet 3 Sig: Take 1 Tab by mouth Daily (before breakfast). BRAND MEDICALLY NECESSARY Class: Print Route: Oral  
  
Follow-up Instructions Return in about 6 months (around 5/17/2018). Patient Instructions 1) TSH is a thyroid test.  Your level is 2.02 which is normal but slightly above goal of 0.5-2.0. This test goes opposite of your thyroid dose and suggests your dose of levothyroxine is likely adequate but because of the variability with generic, sometimes your level will fluctuate and you will feel better with brand medication. 2) We will try brand unithroid at the same dose of 88 mcg daily for the next 2 weeks. If you are feeling better, then go and fill the prescription and take the co-pay card with you. 3) Your calcium level is back to normal so I think it's fine to restart the multivitamin daily but stay off the vitamin D for now. 4) Your vitamin D level is 29.5 which is almost normal.  Goal is over 30.   Hopefully with starting back on the multivitamin, your level will get to goal.   
 
5) I will send you a reminder e-mail 3-4 weeks prior to your next visit and you will have the order already in the [facility] system so you can just go sometime in the 3-7 days before the next visit to have your labs drawn. We will plan on collecting a 24 hour urine with your next set of labs. Introducing [facility] SERVICES! Dear Kiki Denver: Thank you for requesting a [employer] account. Our records indicate that you already have an active [employer] account. You can access your account anytime at [email or web address] Did you know that you can access your hospital and ER discharge instructions at any time in [employer]? You can also review all of your test results from your hospital stay or ER visit. Additional Information If you have questions, please visit the Frequently Asked Questions section of the [employer] website at [email or web address] Remember, [employer] is NOT to be used for urgent needs. For medical emergencies, dial 911. Now available from your iPhone and Android! Please provide this summary of care documentation to your next provider. Your primary care clinician is listed as Moise Stewart. If you have any questions after today's visit, please call [phone].

## 2017-11-17 NOTE — PROGRESS NOTES
Chief Complaint   Patient presents with    Osteoporosis     pcp and pharmacy confirmed     History of Present Illness: Mark Cordero is a 47 y.o. female here for follow up of thyroid. Weight down 4 lbs since last visit in 5/17. Has moved the levothyroxine to 2 am so it no longer interferes with food or coffee and hasn't missed any doses. Has still felt warm regularly but is wondering if this could be from her elevated BP. No constipation. Some dry skin. Some hair loss and brittle nails. Energy is about the same. Dr. Mart Orta just stopped her crestor this week due to myalgias and was told to stay off for 2 weeks and then try pravastatin 20 mg daily. She was also started on lisinopril 10 mg daily but just took her first dose this morning and her BP is very good in my office. Stopped the vitamin D after last visit but was also taking a mvi daily when I saw her last in 5/17 and was unaware of this but she decided to stop this also as it had calcium in it but has had some increase in fatigue off this. Hast not had any further fractures since last visit. Current Outpatient Prescriptions   Medication Sig    lisinopril (PRINIVIL, ZESTRIL) 10 mg tablet Take 10 mg by mouth daily.  hydroCHLOROthiazide (HYDRODIURIL) 12.5 mg tablet Take 1 Tab by mouth daily.  divalproex DR (DEPAKOTE) 250 mg tablet TAKE 3 TABS IN MORNING, 2 IN THE AFTERNOON, AND 3 TABS AT NIGHT    levothyroxine (SYNTHROID) 88 mcg tablet Take 1 Tab by mouth Daily (before breakfast).  BIOTIN, BULK, by Does Not Apply route daily.  clonazePAM (KLONOPIN) 0.5 mg tablet Take 1 Tab by mouth every eight (8) hours as needed (seizure).  pravastatin (PRAVACHOL) 20 mg tablet Take 1 tab daily     No current facility-administered medications for this visit.       Allergies   Allergen Reactions    Crestor [Rosuvastatin] Myalgia    Macrobid [Nitrofurantoin Monohyd/M-Cryst] Other (comments)     headaches     Review of Systems:  - Cardiovascular: no chest pain  - Neurological: no tremors  - Integumentary: skin is normal    Physical Examination:  Blood pressure 123/78, pulse 94, height 5' 6\" (1.676 m), weight 241 lb 9.6 oz (109.6 kg). - General: pleasant, no distress, good eye contact   - Neck: small goiter, no carotid bruits  - Cardiovascular: regular, normal rate, nl s1 and s2, no m/r/g   - Respiratory: clear to auscultation bilaterally   - Integumentary: skin is normal, no edema  - Neurological: reflexes 2+ at biceps, mild tremor  - Psychiatric: normal mood and affect    Data Reviewed:   Component      Latest Ref Rng & Units 11/7/2017 11/7/2017 11/7/2017 11/7/2017          10:56 AM 10:56 AM 10:56 AM 10:56 AM   Glucose      65 - 99 mg/dL    77   BUN      6 - 24 mg/dL    18   Creatinine      0.57 - 1.00 mg/dL    0.53 (L)   GFR est non-AA      >59 mL/min/1.73    109   GFR est AA      >59 mL/min/1.73    125   BUN/Creatinine ratio      9 - 23    34 (H)   Sodium      134 - 144 mmol/L    145 (H)   Potassium      3.5 - 5.2 mmol/L    4.1   Chloride      96 - 106 mmol/L    101   CO2      18 - 29 mmol/L    28   Calcium      8.7 - 10.2 mg/dL    10.0   T4, Free      0.82 - 1.77 ng/dL   1.47    TSH      0.450 - 4.500 uIU/mL  2.020     VITAMIN D, 25-HYDROXY      30.0 - 100.0 ng/mL 29.8 (L)          Assessment/Plan:     1. Osteopenia: She was diagnosed with this based on DXA at Dr. Ok Garcia office in Nov 2012 that showed November 2012 that showed T-score at her lumbar spine was -1.2 which, -0.4 at the left total hip which was normal and -1.4 at the left femoral neck which was also consistent with osteopenia and started developing stress fractures in her feet. She has risk factors for low bone density of an almost 30 pack year history of smoking, chronic anti-seizure meds with dilantin and depakote, intermittent courses of prednisone 1-2 times a year for sinus issues and also being in a post-menopausal state.   She was started on alendronate 70 mg weekly by PCP and repeat DXA in 1/17 showed:- L-spine: T-score -1.0 (2.3% increase since 2012), left total hip: T-score -0.1 (3% increase since 2012), and left femoral neck: T-score -0.9 (8.6% increase since 2012) so I stopped the alendronate as everything was back to normal at that time. - repeat DXA in 1/19      2. Hypothyroidism (acquired): TSH 2.019 in 6/16 on levothyroxine 75 mcg daily but had gained 8 lbs since then and TSH was 2.79 in 1/17 so increased her dose to 88 mcg daily but had been taking with food and coffee and TSH 2.15 in 5/17 so had her separate from food and coffee but only down to 2.02 in 11/17 so will change to brand unithroid in case her levels are fluctuating on the generic.  - change to unithroid 88 mcg daily   - check TSH and free T4 prior to next visit      3. Hypercalciuria: was found to have a 24 hour urine calcium of 495 in 10/16 by Dr. Mil Sarabia. She decided to stop her calcium supplements at that time. Her vitamin D was normal at 38 in 10/16 and her PTH was normal at 30. Her repeat was still high at 361 in 1/17 so I added hctz 12.5 mg daily and down to 257 in 4/17 so will continue this dose and repeat 24 hour urine in 1 year. Her serum calcium was 10.4 in 5/17 so stopped her vitamin D 1000 units and mvi daily and back to 10.0 in 11/17 so told her she can restart her mvi now  - cont hctz 12.5 mg daily  - collect 24 hour urine prior to next visit  - check bmp and vitamin D 25-OH prior to next visit        Patient Instructions   1) TSH is a thyroid test.  Your level is 2.02 which is normal but slightly above goal of 0.5-2.0. This test goes opposite of your thyroid dose and suggests your dose of levothyroxine is likely adequate but because of the variability with generic, sometimes your level will fluctuate and you will feel better with brand medication. 2) We will try brand unithroid at the same dose of 88 mcg daily for the next 2 weeks.   If you are feeling better, then go and fill the prescription and take the co-pay card with you. 3) Your calcium level is back to normal so I think it's fine to restart the multivitamin daily but stay off the vitamin D for now. 4) Your vitamin D level is 29.5 which is almost normal.  Goal is over 30. Hopefully with starting back on the multivitamin, your level will get to goal.      5) I will send you a reminder e-mail 3-4 weeks prior to your next visit and you will have the order already in the [facility] system so you can just go sometime in the 3-7 days before the next visit to have your labs drawn. We will plan on collecting a 24 hour urine with your next set of labs. Follow-up Disposition:  Return in about 6 months (around 5/17/2018).     Copy sent to:  Dr. Shanna Souza via [facility] care  Dr. Chanda More

## 2017-11-17 NOTE — PATIENT INSTRUCTIONS
1) TSH is a thyroid test.  Your level is 2.02 which is normal but slightly above goal of 0.5-2.0. This test goes opposite of your thyroid dose and suggests your dose of levothyroxine is likely adequate but because of the variability with generic, sometimes your level will fluctuate and you will feel better with brand medication. 2) We will try brand unithroid at the same dose of 88 mcg daily for the next 2 weeks. If you are feeling better, then go and fill the prescription and take the co-pay card with you. 3) Your calcium level is back to normal so I think it's fine to restart the multivitamin daily but stay off the vitamin D for now. 4) Your vitamin D level is 29.5 which is almost normal.  Goal is over 30. Hopefully with starting back on the multivitamin, your level will get to goal.      5) I will send you a reminder e-mail 3-4 weeks prior to your next visit and you will have the order already in the [employer] system so you can just go sometime in the 3-7 days before the next visit to have your labs drawn. We will plan on collecting a 24 hour urine with your next set of labs.

## 2018-01-11 RX ORDER — LEVOTHYROXINE SODIUM 88 UG/1
TABLET ORAL
Qty: 90 TAB | Refills: 3 | Status: SHIPPED | OUTPATIENT
Start: 2018-01-11 | End: 2019-01-07 | Stop reason: SDUPTHER

## 2018-01-24 ENCOUNTER — TELEPHONE (OUTPATIENT)
Dept: NEUROLOGY | Age: 53
End: 2018-01-24

## 2018-01-24 DIAGNOSIS — G40.209 COMPLEX PARTIAL SEIZURE EVOLVING TO GENERALIZED SEIZURE (HCC): Primary | ICD-10-CM

## 2018-01-24 NOTE — TELEPHONE ENCOUNTER
----- Message from Dayanara Viera sent at 1/24/2018  9:18 AM EST -----  Regarding: Dr. Caryl Sanford  Pt request for a callback in regards to orders that she would like to be sent to [facility] for her to complete before her appt on 02/19/18. Her bets contact number is [phone].

## 2018-01-28 DIAGNOSIS — G40.209 COMPLEX PARTIAL SEIZURE EVOLVING TO GENERALIZED SEIZURE (HCC): ICD-10-CM

## 2018-01-29 RX ORDER — DIVALPROEX SODIUM 250 MG/1
TABLET, DELAYED RELEASE ORAL
Qty: 720 TAB | Refills: 2 | Status: SHIPPED | OUTPATIENT
Start: 2018-01-29 | End: 2018-02-19 | Stop reason: SDUPTHER

## 2018-01-29 NOTE — TELEPHONE ENCOUNTER
Future Appointments  Date Time Provider John Malik   2/19/2018 3:40 PM Grant Vazquez MD [address]   5/21/2018 10:10 AM Gina Crump MD [facility]                         Last Appointment My Department:  3/25/16    Please advise of refill below.    Requested Prescriptions     Pending Prescriptions Disp Refills    divalproex DR (DEPAKOTE) 250 mg tablet [Pharmacy Med Name: DIVALPROEX SOD DR 250MG TAB] 720 Tab 2     Sig: TAKE 3 TABS IN MORNING, 2 IN THE AFTERNOON, AND 3 TABS AT NIGHT

## 2018-02-10 LAB
ALBUMIN SERPL-MCNC: 3.9 G/DL (ref 3.5–5.5)
ALBUMIN/GLOB SERPL: 1.2 {RATIO} (ref 1.2–2.2)
ALP SERPL-CCNC: 59 IU/L (ref 39–117)
ALT SERPL-CCNC: 21 IU/L (ref 0–32)
AST SERPL-CCNC: 43 IU/L (ref 0–40)
BASOPHILS # BLD AUTO: 0 X10E3/UL (ref 0–0.2)
BASOPHILS NFR BLD AUTO: 0 %
BILIRUB SERPL-MCNC: 0.4 MG/DL (ref 0–1.2)
BUN SERPL-MCNC: 20 MG/DL (ref 6–24)
BUN/CREAT SERPL: 36 (ref 9–23)
CALCIUM SERPL-MCNC: 9.8 MG/DL (ref 8.7–10.2)
CHLORIDE SERPL-SCNC: 100 MMOL/L (ref 96–106)
CO2 SERPL-SCNC: 24 MMOL/L (ref 18–29)
CREAT SERPL-MCNC: 0.56 MG/DL (ref 0.57–1)
EOSINOPHIL # BLD AUTO: 0.4 X10E3/UL (ref 0–0.4)
EOSINOPHIL NFR BLD AUTO: 5 %
ERYTHROCYTE [DISTWIDTH] IN BLOOD BY AUTOMATED COUNT: 13.2 % (ref 12.3–15.4)
GFR SERPLBLD CREATININE-BSD FMLA CKD-EPI: 107 ML/MIN/1.73
GFR SERPLBLD CREATININE-BSD FMLA CKD-EPI: 123 ML/MIN/1.73
GLOBULIN SER CALC-MCNC: 3.3 G/DL (ref 1.5–4.5)
GLUCOSE SERPL-MCNC: 114 MG/DL (ref 65–99)
HCT VFR BLD AUTO: 40.4 % (ref 34–46.6)
HGB BLD-MCNC: 13.4 G/DL (ref 11.1–15.9)
IMM GRANULOCYTES # BLD: 0.1 X10E3/UL (ref 0–0.1)
IMM GRANULOCYTES NFR BLD: 1 %
LYMPHOCYTES # BLD AUTO: 3.1 X10E3/UL (ref 0.7–3.1)
LYMPHOCYTES NFR BLD AUTO: 40 %
MCH RBC QN AUTO: 32.1 PG (ref 26.6–33)
MCHC RBC AUTO-ENTMCNC: 33.2 G/DL (ref 31.5–35.7)
MCV RBC AUTO: 97 FL (ref 79–97)
MONOCYTES # BLD AUTO: 0.7 X10E3/UL (ref 0.1–0.9)
MONOCYTES NFR BLD AUTO: 10 %
NEUTROPHILS # BLD AUTO: 3.4 X10E3/UL (ref 1.4–7)
NEUTROPHILS NFR BLD AUTO: 44 %
PLATELET # BLD AUTO: 292 X10E3/UL (ref 150–379)
POTASSIUM SERPL-SCNC: 4.2 MMOL/L (ref 3.5–5.2)
PROT SERPL-MCNC: 7.2 G/DL (ref 6–8.5)
RBC # BLD AUTO: 4.18 X10E6/UL (ref 3.77–5.28)
SODIUM SERPL-SCNC: 143 MMOL/L (ref 134–144)
VALPROATE SERPL-MCNC: 85 UG/ML (ref 50–100)
WBC # BLD AUTO: 7.8 X10E3/UL (ref 3.4–10.8)

## 2018-02-19 ENCOUNTER — OFFICE VISIT (OUTPATIENT)
Dept: NEUROLOGY | Age: 53
End: 2018-02-19

## 2018-02-19 VITALS
HEART RATE: 95 BPM | HEIGHT: 66 IN | BODY MASS INDEX: 39.05 KG/M2 | WEIGHT: 243 LBS | DIASTOLIC BLOOD PRESSURE: 80 MMHG | OXYGEN SATURATION: 98 % | TEMPERATURE: 98 F | SYSTOLIC BLOOD PRESSURE: 148 MMHG | RESPIRATION RATE: 16 BRPM

## 2018-02-19 DIAGNOSIS — G40.209 COMPLEX PARTIAL SEIZURE EVOLVING TO GENERALIZED SEIZURE (HCC): Primary | ICD-10-CM

## 2018-02-19 DIAGNOSIS — E03.4 HYPOTHYROIDISM DUE TO ACQUIRED ATROPHY OF THYROID: ICD-10-CM

## 2018-02-19 RX ORDER — CLONAZEPAM 0.5 MG/1
0.5 TABLET ORAL
Qty: 30 TAB | Refills: 11 | Status: SHIPPED | OUTPATIENT
Start: 2018-02-19 | End: 2019-02-18 | Stop reason: SDUPTHER

## 2018-02-19 RX ORDER — LISINOPRIL 20 MG/1
20 TABLET ORAL DAILY
COMMUNITY
Start: 2018-02-12 | End: 2019-02-18 | Stop reason: ALTCHOICE

## 2018-02-19 RX ORDER — DIVALPROEX SODIUM 250 MG/1
TABLET, DELAYED RELEASE ORAL
Qty: 720 TAB | Refills: 5 | Status: SHIPPED | OUTPATIENT
Start: 2018-02-19 | End: 2019-02-18 | Stop reason: SDUPTHER

## 2018-02-19 NOTE — MR AVS SNAPSHOT
[address], 
[address], [address] [facility] 
[phone] Patient: Joanna Lennox MRN:  [id number] Visit Information Date & Time Provider Department Dept. Phone Encounter #  
 2018  3:00 PM Yolanda Jackson MD [facility] [phone] [id number] Follow-up Instructions Return in about 1 year (around 2019). Your Appointments 2018 10:10 AM  
Follow Up with MD Martín Dawson [facility]) Appt Note: 6 month f/u    [facility] [address] [id number] [address] [place] [address] Upcoming Health Maintenance Date Due Hepatitis C Screening 1965 DTaP/Tdap/Td series (1 - Tdap) 1985 PAP AKA CERVICAL CYTOLOGY 1985 BREAST CANCER SCRN MAMMOGRAM 2014 FOBT Q 1 YEAR AGE 50-75 2014 Influenza Age 5 to Adult 2017 Allergies as of 2018  Review Complete On: 2018 By: Yolanda Jackson MD  
  
 Severity Noted Reaction Type Reactions Crestor [Rosuvastatin]  2017    Myalgia Macrobid [Nitrofurantoin Monohyd/m-cryst]  2013   Intolerance Other (comments)  
 headaches Current Immunizations  Never Reviewed No immunizations on file. Not reviewed this visit You Were Diagnosed With   
  
 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  
 39.22 kg/m 2 2.27 m 2 Preferred Pharmacy Pharmacy Name Phone [facility], [address] [place] [address] [phone] Your Updated Medication List  
  
   
This list is accurate as of: 2/19/18  3:26 PM.  Always use your most recent med list.  
  
  
  
  
 BIOTIN (BULK)  
by Does Not Apply route daily. CENTRUM COMPLETE PO Take  by mouth daily. clonazePAM 0.5 mg tablet Commonly known as:  Dayanara Prince Take 1 Tab by mouth every eight (8) hours as needed (seizure). divalproex  mg tablet Commonly known as:  DEPAKOTE  
TAKE 3 TABS IN MORNING, 2 IN THE AFTERNOON, AND 3 TABS AT NIGHT  
  
 hydroCHLOROthiazide 12.5 mg tablet Commonly known as:  HYDRODIURIL Take 1 Tab by mouth daily. levothyroxine 88 mcg tablet Commonly known as:  SYNTHROID  
TAKE 1 TAB BY MOUTH DAILY (BEFORE BREAKFAST). * lisinopril 10 mg tablet Commonly known as:  Pineda David Take 10 mg by mouth daily. * lisinopril 20 mg tablet Commonly known as:  PRINIVIL, ZESTRIL  
20 mg daily. pravastatin 20 mg tablet Commonly known as:  PRAVACHOL Take 1 tab daily * Notice: This list has 2 medication(s) that are the same as other medications prescribed for you. Read the directions carefully, and ask your doctor or other care provider to review them with you. Prescriptions Printed Refills  
 clonazePAM (KLONOPIN) 0.5 mg tablet 11 Sig: Take 1 Tab by mouth every eight (8) hours as needed (seizure). Class: Print Route: Oral  
  
Prescriptions Sent to Pharmacy Refills  
 divalproex DR (DEPAKOTE) 250 mg tablet 5 Sig: TAKE 3 TABS IN MORNING, 2 IN THE AFTERNOON, AND 3 TABS AT NIGHT  Class: Normal  
 Pharmacy: [id number], [address] [place] [address] ROAD Ph #: [phone] Follow-up Instructions Return in about 1 year (around 2/19/2019). Patient Instructions Please be advised there is a $25 fee for all paperwork to be completed from our  providers. This is to be paid by the patient prior to picking up the completed forms. A Healthy Lifestyle: Care Instructions Your Care Instructions A healthy lifestyle can help you feel good, stay at a healthy weight, and have plenty of energy for both work and play. A healthy lifestyle is something you can share with your whole family. A healthy lifestyle also can lower your risk for serious health problems, such as high blood pressure, heart disease, and diabetes. You can follow a few steps listed below to improve your health and the health of your family. Follow-up care is a key part of your treatment and safety. Be sure to make and go to all appointments, and call your doctor if you are having problems. It's also a good idea to know your test results and keep a list of the medicines you take. How can you care for yourself at home? · Do not eat too much sugar, fat, or fast foods. You can still have dessert and treats now and then. The goal is moderation. · Start small to improve your eating habits. Pay attention to portion sizes, drink less juice and soda pop, and eat more fruits and vegetables. ¨ Eat a healthy amount of food. A 3-ounce serving of meat, for example, is about the size of a deck of cards. Fill the rest of your plate with vegetables and whole grains. ¨ Limit the amount of soda and sports drinks you have every day. Drink more water when you are thirsty. ¨ Eat at least 5 servings of fruits and vegetables every day. It may seem like a lot, but it is not hard to reach this goal. A serving or helping is 1 piece of fruit, 1 cup of vegetables, or 2 cups of leafy, raw vegetables.  Have an apple or some carrot sticks as an afternoon snack instead of a candy bar. Try to have fruits and/or vegetables at every meal. 
· Make exercise part of your daily routine. You may want to start with simple activities, such as walking, bicycling, or slow swimming. Try to be active 30 to 60 minutes every day. You do not need to do all 30 to 60 minutes all at once. For example, you can exercise 3 times a day for 10 or 20 minutes. Moderate exercise is safe for most people, but it is always a good idea to talk to your doctor before starting an exercise program. 
· Keep moving. [place] the lawn, work in the garden, or [employer]. Take the stairs instead of the elevator at work. · If you smoke, quit. People who smoke have an increased risk for heart attack, stroke, cancer, and other lung illnesses. Quitting is hard, but there are ways to boost your chance of quitting tobacco for good. ¨ Use nicotine gum, patches, or lozenges. ¨ Ask your doctor about stop-smoking programs and medicines. ¨ Keep trying. In addition to reducing your risk of diseases in the future, you will notice some benefits soon after you stop using tobacco. If you have shortness of breath or asthma symptoms, they will likely get better within a few weeks after you quit. · Limit how much alcohol you drink. Moderate amounts of alcohol (up to 2 drinks a day for men, 1 drink a day for women) are okay. But drinking too much can lead to liver problems, high blood pressure, and other health problems. Family health If you have a family, there are many things you can do together to improve your health. · Eat meals together as a family as often as possible. · Eat healthy foods. This includes fruits, vegetables, lean meats and dairy, and whole grains. · Include your family in your fitness plan. Most people think of activities such as jogging or tennis as the way to fitness, but there are many ways you and your family can be more active.  Anything that makes you breathe hard and gets your heart pumping is exercise. Here are some tips: 
¨ Walk to do errands or to take your child to school or the bus. ¨ Go for a family bike ride after dinner instead of watching TV. Where can you learn more? Go to http://jonathan-louise.info/. Enter T233 in the search box to learn more about \"A Healthy Lifestyle: Care Instructions. \" Current as of: May 12, 2017 Content Version: 11.4 © 0468-2123 [employer]. Care instructions adapted under license by [employer] (which disclaims liability or warranty for this information). If you have questions about a medical condition or this instruction, always ask your healthcare professional. [address] any warranty or liability for your use of this information. Introducing [facility] HE[facility]! Dear Benedicto Mccormick: Thank you for requesting a [employer] account. Our records indicate that you already have an active [employer] account. You can access your account anytime at [email or web address] Did you know that you can access your hospital and ER discharge instructions at any time in [employer]? You can also review all of your test results from your hospital stay or ER visit. Additional Information If you have questions, please visit the Frequently Asked Questions section of the [employer] website at [email or web address] Remember, Neura is NOT to be used for urgent needs. For medical emergencies, dial 911. Now available from your iPhone and Android! Please provide this summary of care documentation to your next provider. Your primary care clinician is listed as Jeffrey Hassan. If you have any questions after today's visit, please call [phone].

## 2018-02-19 NOTE — LETTER
2/19/2018 8:14 PM 
 
Patient:  Yen Foster YOB: 1965 Date of Visit: 2/19/2018 Dear No Recipients: Thank you for referring Ms. Jovan Pena to me for evaluation/treatment. Below are the relevant portions of my assessment and plan of care. Consult Subjective:  
 
Yen Foster is a 47 y. o.right-handed  female seen today for evaluation of new problem of recent diagnosis of osteoporosis because of foot fracture, and possibly a consequence of her seizure medication of Dilantin previously, and now Depakote. We are seeing her at the request of Dr. Amanda Hope and Dr. Yonatan Garnica her endocrinologist.  They are giving her vitamin D, and she is off other supplements at this time, and she seems to have a problem or excreting too much calcium in her urine, and started on a diuretic for that. We discussed with her in detail the possibility of switching to 1 of the new seizure medications like Keppra or Vimpat or Briviac but the patient does not want to change her medication under any terms. She is also seen for needing refills on her medications, and her last seizure was 30 years ago. Patient stopped her Dilantin and is just on Depakote alone, because she was somewhat toxic on her medication of Dilantin and the last time she checked her Depakote level was therapeutic 1 week ago and her level was 85 which was therapeutic. We discussed continuing her medication or possibly discontinuing it, but she prefers to continue it since she can still drive and does not want to risk having another seizure. She's had no other new focal neurological symptoms or problems in the interim. Her form was filled out in detail today for the patient, and we spent 10 minutes counseling the patient on further treatment and evaluation and prognosis with her seizures. 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  She has some osteoporosis with fractures of her left foot and has mild osteopenia on bone density scan, but does have therapeutic vitamin D levels on supplementation. She has taken vitamins and vitamin D for years because she is off Dilantin and on Depakote. She has had side effects on her medications of drowsiness and sedation but is doing well otherwise without any new neurological symptoms as far as weakness, sensory loss, gait problems, vision problems, or seizures. A complete review of systems in symptoms was negative for any other new medical problem, complications or illnesses other than the mild osteopenia and foot fractures. Past Medical History:  
Diagnosis Date  Arthritis  Hyperlipidemia  Hypothyroidism (acquired)  Iron deficiency anemia  Osteopenia 2013  Seizures ([address]  Unspecified epilepsy without mention of intractable epilepsy Past Surgical History:  
Procedure Laterality Date  HX  SECTION Family History Problem Relation Age of Onset  Other Mother   
  fibromyalgia, sarcoidosis  Thyroid Disease Mother   
  hypothyroidism  Other Father 30  
  car accident  Heart Disease Maternal Grandmother  Cancer Maternal Grandmother   
  kidney  Cancer Maternal Grandfather   
  colon Social History Substance Use Topics  Smoking status: Former Smoker Packs/day: 1.50 Years: 19.00 Quit date: 2003  Smokeless tobacco: Former User  Alcohol use No  
   
Current Outpatient Prescriptions Medication Sig Dispense Refill  lisinopril (PRINIVIL, ZESTRIL) 20 mg tablet 20 mg daily.  divalproex DR (DEPAKOTE) 250 mg tablet TAKE 3 TABS IN MORNING, 2 IN THE AFTERNOON, AND 3 TABS AT NIGHT 720 Tab 5  clonazePAM (KLONOPIN) 0.5 mg tablet Take 1 Tab by mouth every eight (8) hours as needed (seizure). 30 Tab 11  
 levothyroxine (SYNTHROID) 88 mcg tablet TAKE 1 TAB BY MOUTH DAILY (BEFORE BREAKFAST).  90 Tab 3  
  pravastatin (PRAVACHOL) 20 mg tablet Take 1 tab daily  hydroCHLOROthiazide (HYDRODIURIL) 12.5 mg tablet Take 1 Tab by mouth daily. 90 Tab 3  
 BIOTIN, BULK, by Does Not Apply route daily.  lisinopril (PRINIVIL, ZESTRIL) 10 mg tablet Take 10 mg by mouth daily.  MULTIVITAMIN/IRON/FOLIC ACID (CENTRUM COMPLETE PO) Take  by mouth daily. Allergies Allergen Reactions  Crestor [Rosuvastatin] Myalgia  Macrobid [Nitrofurantoin Monohyd/M-Cryst] Other (comments)  
  headaches Review of Systems: A comprehensive review of systems was negative except for: Musculoskeletal: positive for myalgias, arthralgias, stiff joints and bone pain Neurological: positive for seizures Objective: I 
 
 
NEUROLOGICAL EXAM: 
 
Appearance: The patient is well developed, well nourished, provides a coherent history and is in no acute distress Patient is mildly overweight Mental Status: Oriented to time, place and person and the present, cognitive function is normal and speech is fluent. Mood and affect appropriate. Cranial Nerves:   Intact visual fields. Fundi are benign. BOBO, EOM's full, no nystagmus, no ptosis. Facial sensation is normal. Corneal reflexes are not tested. Facial movement is symmetric. Hearing is normal bilaterally. Palate is midline with normal sternocleidomastoid and trapezius muscles are normal. Tongue is midline. Neck without meningismus or bruits Temporal arteries are not tender or enlarged Motor:  5/5 strength in upper and lower proximal and distal muscles. Normal bulk and tone. No fasciculations. Right leg seems tender and patient has mild edema of her ankle, but no clear signs of DVT Reflexes:   Deep tendon reflexes 2+/4 and symmetrical. 
No Babinski or clonus present Sensory:   Normal to touch, pinprick and vibration and DSS. Gait:  Normal gait. Tremor:   No tremor noted. Cerebellar:  No cerebellar signs present. Neurovascular:  Normal heart sounds and regular rhythm, peripheral pulses decreased, mild 1+ edema of right ankle, and no carotid bruits. Assessment:  
 
Plan:  
 
We discussed with the patient in detail, switching to new medication for her seizure, but she absolutely refuses and wants to continue her Depakote despite her calcium problems Her recent Depakote level was therapeutic, we did not do any labs today. She has not had a seizure in 30 years, but she prefers to continue the medication because she has done so well for all these years, and has so little side effect of the medications now She is to stay off Dilantin and continue Depakote only now Medication refill sent him for patient today 30 minutes spent with patient today, discussing her diagnosis, prognosis, further treatment and therapy and evaluation and filling out her forms, and examining her in detail and reviewing her past records Patient to remain mentally and physically active and take her vitamins and vitamin D daily. Patient will call if any problem followup will be done in one years time or earlier if needed Medications reviewed, side effects trauma with patient, benefits other medicines call with patient. Patient does not want to make any switches in her medications and will continue current doses as prescribed Signed By: Griselda Oropeza MD   
 February 19, 2018 This note will not be viewable in [address] If you have questions, please do not hesitate to call me. I look forward to following Ms. Townsend along with you. Sincerely, Griselda Oropeza MD

## 2018-02-19 NOTE — PATIENT INSTRUCTIONS
Please be advised there is a $25 fee for all paperwork to be completed from our  providers. This is to be paid by the patient prior to picking up the completed forms. A Healthy Lifestyle: Care Instructions  Your Care Instructions    A healthy lifestyle can help you feel good, stay at a healthy weight, and have plenty of energy for both work and play. A healthy lifestyle is something you can share with your whole family. A healthy lifestyle also can lower your risk for serious health problems, such as high blood pressure, heart disease, and diabetes. You can follow a few steps listed below to improve your health and the health of your family. Follow-up care is a key part of your treatment and safety. Be sure to make and go to all appointments, and call your doctor if you are having problems. It's also a good idea to know your test results and keep a list of the medicines you take. How can you care for yourself at home? · Do not eat too much sugar, fat, or fast foods. You can still have dessert and treats now and then. The goal is moderation. · Start small to improve your eating habits. Pay attention to portion sizes, drink less juice and soda pop, and eat more fruits and vegetables. ¨ Eat a healthy amount of food. A 3-ounce serving of meat, for example, is about the size of a deck of cards. Fill the rest of your plate with vegetables and whole grains. ¨ Limit the amount of soda and sports drinks you have every day. Drink more water when you are thirsty. ¨ Eat at least 5 servings of fruits and vegetables every day. It may seem like a lot, but it is not hard to reach this goal. A serving or helping is 1 piece of fruit, 1 cup of vegetables, or 2 cups of leafy, raw vegetables. Have an apple or some carrot sticks as an afternoon snack instead of a candy bar. Try to have fruits and/or vegetables at every meal.  · Make exercise part of your daily routine.  You may want to start with simple activities, such as walking, bicycling, or slow swimming. Try to be active 30 to 60 minutes every day. You do not need to do all 30 to 60 minutes all at once. For example, you can exercise 3 times a day for 10 or 20 minutes. Moderate exercise is safe for most people, but it is always a good idea to talk to your doctor before starting an exercise program.  · Keep moving. Abelardo Whatleyagi the lawn, work in the garden, or [employer]. Take the stairs instead of the elevator at work. · If you smoke, quit. People who smoke have an increased risk for heart attack, stroke, cancer, and other lung illnesses. Quitting is hard, but there are ways to boost your chance of quitting tobacco for good. ¨ Use nicotine gum, patches, or lozenges. ¨ Ask your doctor about stop-smoking programs and medicines. ¨ Keep trying. In addition to reducing your risk of diseases in the future, you will notice some benefits soon after you stop using tobacco. If you have shortness of breath or asthma symptoms, they will likely get better within a few weeks after you quit. · Limit how much alcohol you drink. Moderate amounts of alcohol (up to 2 drinks a day for men, 1 drink a day for women) are okay. But drinking too much can lead to liver problems, high blood pressure, and other health problems. Family health  If you have a family, there are many things you can do together to improve your health. · Eat meals together as a family as often as possible. · Eat healthy foods. This includes fruits, vegetables, lean meats and dairy, and whole grains. · Include your family in your fitness plan. Most people think of activities such as jogging or tennis as the way to fitness, but there are many ways you and your family can be more active. Anything that makes you breathe hard and gets your heart pumping is exercise. Here are some tips:  ¨ Walk to do errands or to take your child to school or the bus. ¨ Go for a family bike ride after dinner instead of watching TV.   Where can you learn more? Go to http://jonathan-louise.info/. Enter A183 in the search box to learn more about \"A Healthy Lifestyle: Care Instructions. \"  Current as of: May 12, 2017  Content Version: 11.4  © 1413-9514 Healthwise, [employer]. Care instructions adapted under license by [employer] (which disclaims liability or warranty for this information). If you have questions about a medical condition or this instruction, always ask your healthcare professional. [address] any warranty or liability for your use of this information.

## 2018-02-20 NOTE — PROGRESS NOTES
Consult    Subjective:     Shayy Manuel is a 47 y. o.right-handed  female seen today for evaluation of new problem of recent diagnosis of osteoporosis because of foot fracture, and possibly a consequence of her seizure medication of Dilantin previously, and now Depakote. We are seeing her at the request of Dr. Marco Antonio Nuñez and Dr. Ronquillo Keep her endocrinologist.  They are giving her vitamin D, and she is off other supplements at this time, and she seems to have a problem or excreting too much calcium in her urine, and started on a diuretic for that. We discussed with her in detail the possibility of switching to 1 of the new seizure medications like Keppra or Vimpat or Briviac but the patient does not want to change her medication under any terms. She is also seen for needing refills on her medications, and her last seizure was 30 years ago. Patient stopped her Dilantin and is just on Depakote alone, because she was somewhat toxic on her medication of Dilantin and the last time she checked her Depakote level was therapeutic 1 week ago and her level was 85 which was therapeutic. We discussed continuing her medication or possibly discontinuing it, but she prefers to continue it since she can still drive and does not want to risk having another seizure. She's had no other new focal neurological symptoms or problems in the interim. Her form was filled out in detail today for the patient, and we spent 10 minutes counseling the patient on further treatment and evaluation and prognosis with her seizures. 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 She has some osteoporosis with fractures of her left foot and has mild osteopenia on bone density scan, but does have therapeutic vitamin D levels on supplementation. She has taken vitamins and vitamin D for years because she is off Dilantin and on Depakote.  She has had side effects on her medications of drowsiness and sedation but is doing well otherwise without any new neurological symptoms as far as weakness, sensory loss, gait problems, vision problems, or seizures. A complete review of systems in symptoms was negative for any other new medical problem, complications or illnesses other than the mild osteopenia and foot fractures. Past Medical History:   Diagnosis Date    Arthritis     Hyperlipidemia     Hypothyroidism (acquired)     Iron deficiency anemia     Osteopenia 2013    Seizures (HCC)     Unspecified epilepsy without mention of intractable epilepsy       Past Surgical History:   Procedure Laterality Date    HX  SECTION       Family History   Problem Relation Age of Onset    Other Mother      fibromyalgia, sarcoidosis    Thyroid Disease Mother      hypothyroidism    Other Father 27     car accident    Heart Disease Maternal Grandmother     Cancer Maternal Grandmother      kidney    Cancer Maternal Grandfather      colon      Social History   Substance Use Topics    Smoking status: Former Smoker     Packs/day: 1.50     Years: 19.00     Quit date: 2003    Smokeless tobacco: Former User    Alcohol use No       Current Outpatient Prescriptions   Medication Sig Dispense Refill    lisinopril (PRINIVIL, ZESTRIL) 20 mg tablet 20 mg daily.  divalproex DR (DEPAKOTE) 250 mg tablet TAKE 3 TABS IN MORNING, 2 IN THE AFTERNOON, AND 3 TABS AT NIGHT 720 Tab 5    clonazePAM (KLONOPIN) 0.5 mg tablet Take 1 Tab by mouth every eight (8) hours as needed (seizure). 30 Tab 11    levothyroxine (SYNTHROID) 88 mcg tablet TAKE 1 TAB BY MOUTH DAILY (BEFORE BREAKFAST). 90 Tab 3    pravastatin (PRAVACHOL) 20 mg tablet Take 1 tab daily      hydroCHLOROthiazide (HYDRODIURIL) 12.5 mg tablet Take 1 Tab by mouth daily. 90 Tab 3    BIOTIN, BULK, by Does Not Apply route daily.  lisinopril (PRINIVIL, ZESTRIL) 10 mg tablet Take 10 mg by mouth daily.       MULTIVITAMIN/IRON/FOLIC ACID (CENTRUM COMPLETE PO) Take  by mouth daily. Allergies   Allergen Reactions    Crestor [Rosuvastatin] Myalgia    Macrobid [Nitrofurantoin Monohyd/M-Cryst] Other (comments)     headaches        Review of Systems:  A comprehensive review of systems was negative except for: Musculoskeletal: positive for myalgias, arthralgias, stiff joints and bone pain  Neurological: positive for seizures     Objective:     I      NEUROLOGICAL EXAM:    Appearance: The patient is well developed, well nourished, provides a coherent history and is in no acute distress  Patient is mildly overweight   Mental Status: Oriented to time, place and person and the present, cognitive function is normal and speech is fluent. Mood and affect appropriate. Cranial Nerves:   Intact visual fields. Fundi are benign. BOBO, EOM's full, no nystagmus, no ptosis. Facial sensation is normal. Corneal reflexes are not tested. Facial movement is symmetric. Hearing is normal bilaterally. Palate is midline with normal sternocleidomastoid and trapezius muscles are normal. Tongue is midline. Neck without meningismus or bruits  Temporal arteries are not tender or enlarged   Motor:  5/5 strength in upper and lower proximal and distal muscles. Normal bulk and tone. No fasciculations. Right leg seems tender and patient has mild edema of her ankle, but no clear signs of DVT   Reflexes:   Deep tendon reflexes 2+/4 and symmetrical.  No Babinski or clonus present   Sensory:   Normal to touch, pinprick and vibration and DSS. Gait:  Normal gait. Tremor:   No tremor noted. Cerebellar:  No cerebellar signs present. Neurovascular:  Normal heart sounds and regular rhythm, peripheral pulses decreased, mild 1+ edema of right ankle, and no carotid bruits.            Assessment:     Plan:     We discussed with the patient in detail, switching to new medication for her seizure, but she absolutely refuses and wants to continue her Depakote despite her calcium problems  Her recent Depakote level was therapeutic, we did not do any labs today. She has not had a seizure in 30 years, but she prefers to continue the medication because she has done so well for all these years, and has so little side effect of the medications now  She is to stay off Dilantin and continue Depakote only now  Medication refill sent him for patient today  30 minutes spent with patient today, discussing her diagnosis, prognosis, further treatment and therapy and evaluation and filling out her forms, and examining her in detail and reviewing her past records  Patient to remain mentally and physically active and take her vitamins and vitamin D daily. Patient will call if any problem followup will be done in one years time or earlier if needed  Medications reviewed, side effects trauma with patient, benefits other medicines call with patient. Patient does not want to make any switches in her medications and will continue current doses as prescribed    Signed By: Griselda Oropeza MD     February 19, 2018       This note will not be viewable in [address]

## 2018-05-02 LAB
25(OH)D3+25(OH)D2 SERPL-MCNC: 25.7 NG/ML (ref 30–100)
BUN SERPL-MCNC: 18 MG/DL (ref 6–24)
BUN/CREAT SERPL: 30 (ref 9–23)
CALCIUM SERPL-MCNC: 10 MG/DL (ref 8.7–10.2)
CHLORIDE SERPL-SCNC: 98 MMOL/L (ref 96–106)
CO2 SERPL-SCNC: 24 MMOL/L (ref 18–29)
CREAT SERPL-MCNC: 0.6 MG/DL (ref 0.57–1)
GFR SERPLBLD CREATININE-BSD FMLA CKD-EPI: 104 ML/MIN/1.73
GFR SERPLBLD CREATININE-BSD FMLA CKD-EPI: 120 ML/MIN/1.73
GLUCOSE SERPL-MCNC: 87 MG/DL (ref 65–99)
POTASSIUM SERPL-SCNC: 5 MMOL/L (ref 3.5–5.2)
SODIUM SERPL-SCNC: 139 MMOL/L (ref 134–144)
T4 FREE SERPL-MCNC: 1.65 NG/DL (ref 0.82–1.77)
TSH SERPL DL<=0.005 MIU/L-ACNC: 1.79 UIU/ML (ref 0.45–4.5)

## 2018-05-04 LAB
CALCIUM 24H UR-MCNC: 11.3 MG/DL
CALCIUM 24H UR-MRATE: 178 MG/24 HR (ref 100–300)

## 2018-05-21 ENCOUNTER — OFFICE VISIT (OUTPATIENT)
Dept: ENDOCRINOLOGY | Age: 53
End: 2018-05-21

## 2018-05-21 VITALS
HEART RATE: 95 BPM | DIASTOLIC BLOOD PRESSURE: 69 MMHG | WEIGHT: 245.2 LBS | BODY MASS INDEX: 39.41 KG/M2 | SYSTOLIC BLOOD PRESSURE: 123 MMHG | HEIGHT: 66 IN

## 2018-05-21 DIAGNOSIS — R82.994 HYPERCALCIURIA: ICD-10-CM

## 2018-05-21 DIAGNOSIS — M85.80 OSTEOPENIA, UNSPECIFIED LOCATION: ICD-10-CM

## 2018-05-21 DIAGNOSIS — E03.9 HYPOTHYROIDISM (ACQUIRED): Primary | ICD-10-CM

## 2018-05-21 RX ORDER — GLUCOSAMINE SULFATE 1500 MG
POWDER IN PACKET (EA) ORAL DAILY
COMMUNITY

## 2018-05-21 RX ORDER — HYDROCHLOROTHIAZIDE 12.5 MG/1
12.5 TABLET ORAL DAILY
Qty: 90 TAB | Refills: 3 | Status: SHIPPED | OUTPATIENT
Start: 2018-05-21 | End: 2019-02-21

## 2018-05-21 NOTE — PATIENT INSTRUCTIONS
1) TSH is a thyroid test.  Your level is 1.7 which is normal and at goal of 0.5-2.0. This test goes opposite of your thyroid dose and suggests your dose of levothyroxine is perfect so I will keep your dose the same. 2) Your vitamin D level is 25 which is still slightly low. Goal is over 30. Please start taking 1000 units of vitamin D3 daily to keep your levels at goal.    3) BUN and creatinine are markers of kidney function. Your values are normal.    4) Your blood calcium and urine calcium are normal so keep taking hctz 12.5 mg daily. 5) I will arrange a repeat bone density scan done at [facility] in Jan 2019 and we'll review the results at your next visit.

## 2018-05-21 NOTE — MR AVS SNAPSHOT
[facility] [address] [id number] [phone] Patient: Myles Patton MRN:  [id number] Visit Information Date & Time Provider Department Dept. Phone Encounter #  
 2018 10:10 AM Sheng James MD [facility] [phone] [id number] Follow-up Instructions Return in about 9 months (around 2019). Your Appointments 2019  3:20 PM  
Follow Up with Celestino Leon MD  
[facility] at [facility] [address]) Appt Note: f/u seizure, jrb 18; f/u seizure, jrb 18  
 [facility], 
[address] [place] [address], [address] [address]  
[address] [facility], [address] [place] [address], [address] [address] Upcoming Health Maintenance Date Due Hepatitis C Screening 1965 DTaP/Tdap/Td series (1 - Tdap) 1985 PAP AKA CERVICAL CYTOLOGY 1985 BREAST CANCER SCRN MAMMOGRAM 2014 FOBT Q 1 YEAR AGE 50-75 2014 Influenza Age 5 to Adult 2018 Allergies as of 2018  Review Complete On: 2018 By: Sheng James MD  
  
 Severity Noted Reaction Type Reactions Crestor [Rosuvastatin]  2017    Myalgia Macrobid [Nitrofurantoin Monohyd/m-cryst]  2013   Intolerance Other (comments)  
 headaches Current Immunizations  Never Reviewed No immunizations on file. Not reviewed this visit You Were Diagnosed With   
  
 Codes Comments Hypothyroidism (acquired)    -  Primary ICD-10-CM: E03.9 ICD-9-CM: 244.9 Osteopenia, unspecified location     ICD-10-CM: M85.80 ICD-9-CM: 733.90 Hypercalciuria     ICD-10-CM: E83.50 ICD-9-CM: 275.40 Vitals BP Pulse Height(growth percentile) Weight(growth percentile) BMI OB Status 123/69 95 5' 6\" (1.676 m) 245 lb 3.2 oz (111.2 kg) 39.58 kg/m2 Menopause Smoking Status Former Smoker Vitals History BMI and BSA Data Body Mass Index Body Surface Area  
 39.58 kg/m 2 2.28 m 2 Preferred Pharmacy Pharmacy Name Phone [facility] [address] [place] [address] [phone] Your Updated Medication List  
  
   
This list is accurate as of 5/21/18 10:52 AM.  Always use your most recent med list.  
  
  
  
  
 BIOTIN (BULK)  
by Does Not Apply route daily. clonazePAM 0.5 mg tablet Commonly known as:  [place] Take 1 Tab by mouth every eight (8) hours as needed (seizure). divalproex  mg tablet Commonly known as:  DEPAKOTE  
TAKE 3 TABS IN MORNING, 2 IN THE AFTERNOON, AND 3 TABS AT NIGHT  
  
 hydroCHLOROthiazide 12.5 mg tablet Commonly known as:  HYDRODIURIL Take 1 Tab by mouth daily. levothyroxine 88 mcg tablet Commonly known as:  SYNTHROID  
TAKE 1 TAB BY MOUTH DAILY (BEFORE BREAKFAST). lisinopril 20 mg tablet Commonly known as:  PRINIVIL, ZESTRIL  
20 mg daily. pravastatin 20 mg tablet Commonly known as:  PRAVACHOL 40 mg. Take 1 tab daily We Performed the Following METABOLIC PANEL, BASIC [95650 CPT(R)] T4, FREE [id number] CPT(R)] TSH 3RD GENERATION [72040 CPT(R)] VITAMIN D, 25 HYDROXY [id number] CPT(R)] Follow-up Instructions Return in about 9 months (around 2/21/2019). Patient Instructions 1) TSH is a thyroid test.  Your level is 1.7 which is normal and at goal of 0.5-2.0. This test goes opposite of your thyroid dose and suggests your dose of levothyroxine is perfect so I will keep your dose the same. 2) Your vitamin D level is 25 which is still slightly low. Goal is over 30. Please start taking 1000 units of vitamin D3 daily to keep your levels at goal. 
 
3) BUN and creatinine are markers of kidney function.   Your values are normal. 
 
4) Your blood calcium and urine calcium are normal so keep taking hctz 12.5 mg daily. 5) I will arrange a repeat bone density scan done at [facility] in Jan 2019 and we'll review the results at your next visit. Introducing [facility]! Dear Alex Contreras: Thank you for requesting a [employer] account. Our records indicate that you already have an active [employer] account. You can access your account anytime at [email or web address] Did you know that you can access your hospital and ER discharge instructions at any time in [employer]? You can also review all of your test results from your hospital stay or ER visit. Additional Information If you have questions, please visit the Frequently Asked Questions section of the [employer] website at [email or web address] Remember, [employer] is NOT to be used for urgent needs. For medical emergencies, dial 911. Now available from your iPhone and Android! Please provide this summary of care documentation to your next provider. Your primary care clinician is listed as Nikki Sims. If you have any questions after today's visit, please call [phone].

## 2018-05-21 NOTE — PROGRESS NOTES
Chief Complaint   Patient presents with    Thyroid Problem     pcp and pharmacy confimed     History of Present Illness: Maria Isabel Yan is a 46 y.o. female here for follow up of thyroid. Weight up 4 lbs since last visit in 11/17. Never ended up trying the samples of unithroid after last visit as she felt fine on generic and has stayed on levothyroxine 88 mcg daily. Still taking at 2 am and not missing and doses or run out. Bowels are regular. Still with some hair loss. Does have some hot flashes but otherwise is comfortable. Still taking one hctz everyday. No falls or fractures. Never restarted the mvi after last visit and her D level remains low. Current Outpatient Prescriptions   Medication Sig    lisinopril (PRINIVIL, ZESTRIL) 20 mg tablet 20 mg daily.  divalproex DR (DEPAKOTE) 250 mg tablet TAKE 3 TABS IN MORNING, 2 IN THE AFTERNOON, AND 3 TABS AT NIGHT    clonazePAM (KLONOPIN) 0.5 mg tablet Take 1 Tab by mouth every eight (8) hours as needed (seizure).  levothyroxine (SYNTHROID) 88 mcg tablet TAKE 1 TAB BY MOUTH DAILY (BEFORE BREAKFAST).  pravastatin (PRAVACHOL) 20 mg tablet 40 mg. Take 1 tab daily    hydroCHLOROthiazide (HYDRODIURIL) 12.5 mg tablet Take 1 Tab by mouth daily.  BIOTIN, BULK, by Does Not Apply route daily. No current facility-administered medications for this visit. Allergies   Allergen Reactions    Crestor [Rosuvastatin] Myalgia    Macrobid [Nitrofurantoin Monohyd/M-Cryst] Other (comments)     headaches     Review of Systems:  - Cardiovascular: no chest pain  - Neurological: no tremors  - Integumentary: skin is normal    Physical Examination:  Blood pressure 123/69, pulse 95, height 5' 6\" (1.676 m), weight 245 lb 3.2 oz (111.2 kg).   - General: pleasant, no distress, good eye contact   - Neck: small goiter, no carotid bruits  - Cardiovascular: regular, normal rate, nl s1 and s2, no m/r/g   - Respiratory: clear to auscultation bilaterally - Integumentary: skin is normal, no edema  - Neurological: reflexes 2+ at biceps, mild tremor  - Psychiatric: normal mood and affect    Data Reviewed:   Component      Latest Ref Rng & Units 5/1/2018 5/1/2018 5/1/2018 5/1/2018           8:44 AM  8:44 AM  8:44 AM  8:44 AM   Glucose      65 - 99 mg/dL 87      BUN      6 - 24 mg/dL 18      Creatinine      0.57 - 1.00 mg/dL 0.60      GFR est non-AA      >59 mL/min/1.73 104      GFR est AA      >59 mL/min/1.73 120      BUN/Creatinine ratio      9 - 23 30 (H)      Sodium      134 - 144 mmol/L 139      Potassium      3.5 - 5.2 mmol/L 5.0      Chloride      96 - 106 mmol/L 98      CO2      18 - 29 mmol/L 24      Calcium      8.7 - 10.2 mg/dL 10.0      VITAMIN D, 25-HYDROXY      30.0 - 100.0 ng/mL    25.7 (L)   T4, Free      0.82 - 1.77 ng/dL   1.65    TSH      0.450 - 4.500 uIU/mL  1.790       Component      Latest Ref Rng & Units 5/3/2018           5:30 AM   Calcium,urine mg/dL      Not Estab. mg/dL 11.3   Calcium mg/24 hr      100.0 - 300.0 mg/24 hr 178.0       Assessment/Plan:     1. Osteopenia: She was diagnosed with this based on DXA at Dr. Damon Arch office in Nov 2012 that showed November 2012 that showed T-score at her lumbar spine was -1.2 which, -0.4 at the left total hip which was normal and -1.4 at the left femoral neck which was also consistent with osteopenia and started developing stress fractures in her feet. She has risk factors for low bone density of an almost 30 pack year history of smoking, chronic anti-seizure meds with dilantin and depakote, intermittent courses of prednisone 1-2 times a year for sinus issues and also being in a post-menopausal state.   She was started on alendronate 70 mg weekly by PCP and repeat DXA in 1/17 showed:- L-spine: T-score -1.0 (2.3% increase since 2012), left total hip: T-score -0.1 (3% increase since 2012), and left femoral neck: T-score -0.9 (8.6% increase since 2012) so I stopped the alendronate as everything was back to normal at that time. - repeat DXA in 1/19 prior to next visit at [facility]      2. Hypothyroidism (acquired): TSH 2.019 in 6/16 on levothyroxine 75 mcg daily but had gained 8 lbs since then and TSH was 2.79 in 1/17 so increased her dose to 88 mcg daily but had been taking with food and coffee and TSH 2.15 in 5/17 so had her separate from food and coffee but only down to 2.02 in 11/17 so wanted her to change to brand unithroid in case her levels are fluctuating on the generic but she decided not to do this as she felt well and TSH still 1.79 in 5/18 so will keep her dose the same. - cont levothyroxine 88 mcg daily   - check TSH and free T4 prior to next visit      3. Hypercalciuria: was found to have a 24 hour urine calcium of 495 in 10/16 by Dr. Daryl Kwan. She decided to stop her calcium supplements at that time. Her vitamin D was normal at 38 in 10/16 and her PTH was normal at 30. Her repeat was still high at 361 in 1/17 so I added hctz 12.5 mg daily and down to 257 in 4/17 so continued this dose and repeat in 5/18 was still 178 so will stay on this dose and repeat in 2 years. Her serum calcium was 10.4 in 5/17 so stopped her vitamin D 1000 units and mvi daily and back to 10.0 in 11/17. D level down to 25 in 5/18 so will add back 1000 of D3 daily  - cont hctz 12.5 mg daily  - restart vitamin D 1000 units daily  - collect 24 hour urine in 5/20  - check bmp and vitamin D 25-OH prior to next visit        Patient Instructions   1) TSH is a thyroid test.  Your level is 1.7 which is normal and at goal of 0.5-2.0. This test goes opposite of your thyroid dose and suggests your dose of levothyroxine is perfect so I will keep your dose the same. 2) Your vitamin D level is 25 which is still slightly low. Goal is over 30. Please start taking 1000 units of vitamin D3 daily to keep your levels at goal.    3) BUN and creatinine are markers of kidney function.   Your values are normal.    4) Your blood calcium and urine calcium are normal so keep taking hctz 12.5 mg daily. 5) I will arrange a repeat bone density scan done at [facility] in Jan 2019 and we'll review the results at your next visit. Follow-up Disposition:  Return in about 9 months (around 2/21/2019).     Copy sent to:  Dr. Shelby Underwood via [facility]  Dr. Benita Ordoñez

## 2018-08-16 LAB
CREATININE, EXTERNAL: 0.61
LDL-C, EXTERNAL: 160

## 2019-01-07 RX ORDER — LEVOTHYROXINE SODIUM 88 UG/1
88 TABLET ORAL DAILY
Qty: 90 TAB | Refills: 3 | Status: SHIPPED | OUTPATIENT
Start: 2019-01-07 | End: 2020-01-01 | Stop reason: SDUPTHER

## 2019-01-14 ENCOUNTER — TELEPHONE (OUTPATIENT)
Dept: ENDOCRINOLOGY | Age: 54
End: 2019-01-14

## 2019-01-14 NOTE — TELEPHONE ENCOUNTER
Please arrange for bone density scan at [facility] prior to her visit with me in February. Order is on your desk. Thanks.

## 2019-01-14 NOTE — TELEPHONE ENCOUNTER
Appointment has been scheduled and patient was notified. I spoke with Colin Dean at [address].           Appointment: 2/7/2019  Time 12:15pm  Place: [facility]

## 2019-01-22 DIAGNOSIS — G40.209 COMPLEX PARTIAL SEIZURE EVOLVING TO GENERALIZED SEIZURE (HCC): Primary | ICD-10-CM

## 2019-02-06 LAB
25(OH)D3+25(OH)D2 SERPL-MCNC: 36.2 NG/ML (ref 30–100)
BUN SERPL-MCNC: 22 MG/DL (ref 6–24)
BUN/CREAT SERPL: 25 (ref 9–23)
CALCIUM SERPL-MCNC: 10.4 MG/DL (ref 8.7–10.2)
CHLORIDE SERPL-SCNC: 102 MMOL/L (ref 96–106)
CO2 SERPL-SCNC: 21 MMOL/L (ref 20–29)
CREAT SERPL-MCNC: 0.87 MG/DL (ref 0.57–1)
GLUCOSE SERPL-MCNC: 93 MG/DL (ref 65–99)
POTASSIUM SERPL-SCNC: 4.1 MMOL/L (ref 3.5–5.2)
SODIUM SERPL-SCNC: 143 MMOL/L (ref 134–144)
T4 FREE SERPL-MCNC: 1.5 NG/DL (ref 0.82–1.77)
TSH SERPL DL<=0.005 MIU/L-ACNC: 1.73 UIU/ML (ref 0.45–4.5)
VALPROATE SERPL-MCNC: 71 UG/ML (ref 50–100)

## 2019-02-18 ENCOUNTER — OFFICE VISIT (OUTPATIENT)
Dept: NEUROLOGY | Age: 54
End: 2019-02-18

## 2019-02-18 VITALS
HEIGHT: 66 IN | DIASTOLIC BLOOD PRESSURE: 88 MMHG | WEIGHT: 244 LBS | BODY MASS INDEX: 39.21 KG/M2 | OXYGEN SATURATION: 96 % | SYSTOLIC BLOOD PRESSURE: 140 MMHG | HEART RATE: 99 BPM

## 2019-02-18 DIAGNOSIS — E03.4 HYPOTHYROIDISM DUE TO ACQUIRED ATROPHY OF THYROID: ICD-10-CM

## 2019-02-18 DIAGNOSIS — G40.209 COMPLEX PARTIAL SEIZURE EVOLVING TO GENERALIZED SEIZURE (HCC): ICD-10-CM

## 2019-02-18 PROBLEM — E66.01 SEVERE OBESITY (HCC): Status: ACTIVE | Noted: 2019-02-18

## 2019-02-18 RX ORDER — AMLODIPINE BESYLATE 5 MG/1
5 TABLET ORAL DAILY
COMMUNITY
Start: 2019-02-13 | End: 2019-02-21 | Stop reason: ALTCHOICE

## 2019-02-18 RX ORDER — DIVALPROEX SODIUM 250 MG/1
TABLET, DELAYED RELEASE ORAL
Qty: 720 TAB | Refills: 5 | Status: SHIPPED | OUTPATIENT
Start: 2019-02-18 | End: 2020-03-18

## 2019-02-18 RX ORDER — CLONAZEPAM 0.5 MG/1
0.5 TABLET ORAL
Qty: 30 TAB | Refills: 11 | Status: SHIPPED | OUTPATIENT
Start: 2019-02-18 | End: 2020-03-20 | Stop reason: SDUPTHER

## 2019-02-18 NOTE — LETTER
2/18/2019 9:16 PM 
 
Patient:  Margoth Lara YOB: 1965 Date of Visit: 2/18/2019 Dear No Recipients: Thank you for referring Ms. Eduardo Valle to me for evaluation/treatment. Below are the relevant portions of my assessment and plan of care. Consult Subjective:  
 
Margoth Lara is a 46 y. o.right-handed  female seen today for evaluation of recent diagnosis of osteoporosis because of foot fracture, and possibly a consequence of her seizure medication of Dilantin previously, and now Depakote and her seizure disorder which has remained stable with no seizures in years. .  We are seeing her at the request of Dr. Alisa Sanchez and Dr. Gary Patel her endocrinologist.  We discussed with the patient switching medication but she absolutely refuses and wants to continue her Depakote which we refilled for her today. She is also seen for needing refills on her medications, and her last seizure was 30 years ago. Patient stopped her Dilantin and is just on Depakote alone, because she was somewhat toxic on her medication of Dilantin and the last time she checked her Depakote level was therapeutic 1 week ago and her level was 77 which was therapeutic. We discussed continuing her medication or possibly discontinuing it, but she prefers to continue it since she can still drive and does not want to risk having another seizure. She's had no other new focal neurological symptoms or problems in the interim. Her form was filled out in detail today for the patient, and we spent 10 minutes counseling the patient on further treatment and evaluation and prognosis with her seizures. 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 She has some osteoporosis with fractures of her left foot and has mild osteopenia on bone density scan, but does have therapeutic vitamin D levels on supplementation.  She has taken vitamins and vitamin D for years because she is off Dilantin and on Depakote. She has had side effects on her medications of drowsiness and sedation but is doing well otherwise without any new neurological symptoms as far as weakness, sensory loss, gait problems, vision problems, or seizures. A complete review of systems in symptoms was negative for any other new medical problem, complications or illnesses other than the mild osteopenia and foot fractures. Past Medical History:  
Diagnosis Date  Arthritis  Hyperlipidemia  Hypothyroidism (acquired)  Iron deficiency anemia  Osteopenia 2013  Seizures ([address]  Unspecified epilepsy without mention of intractable epilepsy Past Surgical History:  
Procedure Laterality Date  HX  SECTION Family History Problem Relation Age of Onset  Other Mother   
     fibromyalgia, sarcoidosis  Thyroid Disease Mother   
     hypothyroidism  Other Father 30  
     car accident  Heart Disease Maternal Grandmother  Cancer Maternal Grandmother   
     kidney  Cancer Maternal Grandfather   
     colon Social History Tobacco Use  Smoking status: Former Smoker Packs/day: 1.50 Years: 19.00 Pack years: 28.50 Last attempt to quit: 2003 Years since quittin.1  Smokeless tobacco: Former User Substance Use Topics  Alcohol use: No  
   
Current Outpatient Medications Medication Sig Dispense Refill  amLODIPine (NORVASC) 5 mg tablet  divalproex DR (DEPAKOTE) 250 mg tablet TAKE 3 TABS IN MORNING, 2 IN THE AFTERNOON, AND 3 TABS AT NIGHT 720 Tab 5  clonazePAM (KLONOPIN) 0.5 mg tablet Take 1 Tab by mouth every eight (8) hours as needed (seizure). 30 Tab 11  
 levothyroxine (SYNTHROID) 88 mcg tablet Take 1 Tab by mouth daily. 90 Tab 3  
 hydroCHLOROthiazide (HYDRODIURIL) 12.5 mg tablet Take 1 Tab by mouth daily.  (Patient taking differently: Take 25 mg by mouth daily.) 90 Tab 3  
  cholecalciferol (VITAMIN D3) 1,000 unit cap Take  by mouth daily.  pravastatin (PRAVACHOL) 20 mg tablet 40 mg. Take 1 tab daily  BIOTIN, BULK, by Does Not Apply route daily. Allergies Allergen Reactions  Crestor [Rosuvastatin] Myalgia  Macrobid [Nitrofurantoin Monohyd/M-Cryst] Other (comments)  
  headaches Review of Systems: A comprehensive review of systems was negative except for: Musculoskeletal: positive for myalgias, arthralgias, stiff joints and bone pain Neurological: positive for seizures Objective: I 
 
 
NEUROLOGICAL EXAM: 
 
Appearance: The patient is well developed, well nourished, provides a coherent history and is in no acute distress Patient is mildly overweight Mental Status: Oriented to time, place and person and the present, cognitive function is normal and speech is fluent. Mood and affect appropriate. Cranial Nerves:   Intact visual fields. Fundi are benign. BOBO, EOM's full, no nystagmus, no ptosis. Facial sensation is normal. Corneal reflexes are not tested. Facial movement is symmetric. Hearing is normal bilaterally. Palate is midline with normal sternocleidomastoid and trapezius muscles are normal. Tongue is midline. Neck without meningismus or bruits Temporal arteries are not tender or enlarged Motor:  5/5 strength in upper and lower proximal and distal muscles. Normal bulk and tone. No fasciculations. Right leg seems tender and patient has mild edema of her ankle, but no clear signs of DVT Reflexes:   Deep tendon reflexes 2+/4 and symmetrical. 
No Babinski or clonus present Sensory:   Normal to touch, pinprick and vibration and DSS. Gait:  Normal gait. Tremor:   No tremor noted. Cerebellar:  No cerebellar signs present. Neurovascular:  Normal heart sounds and regular rhythm, peripheral pulses decreased, mild 1+ edema of right ankle, and no carotid bruits. Assessment:  
 
Plan: We discussed with the patient in detail, switching to new medication for her seizure, but she absolutely refuses and wants to continue her Depakote despite her calcium problems Her recent Depakote level was therapeutic, we did not do any labs today. She has not had a seizure in 30 years, but she prefers to continue the medication because she has done so well for all these years, and has so little side effect of the medications now She is to stay off Dilantin and continue Depakote only now Medication refill sent him for patient today 30 minutes spent with patient today, discussing her diagnosis, prognosis, further treatment and therapy and evaluation and filling out her forms, and examining her in detail and reviewing her past records Patient to remain mentally and physically active and take her vitamins and vitamin D daily. Patient will call if any problem followup will be done in one years time or earlier if needed Medications reviewed, side effects trauma with patient, benefits other medicines call with patient. Patient does not want to make any switches in her medications and will continue current doses as prescribed Signed By: Severo Frederic, MD   
 February 18, 2019 This note will not be viewable in [address] If you have questions, please do not hesitate to call me. I look forward to following Ms. Townsend along with you. Sincerely, Severo Frederic, MD

## 2019-02-19 NOTE — PROGRESS NOTES
Consult Subjective:  
 
Israel Erickson is a 46 y. o.right-handed  female seen today for evaluation of recent diagnosis of osteoporosis because of foot fracture, and possibly a consequence of her seizure medication of Dilantin previously, and now Depakote and her seizure disorder which has remained stable with no seizures in years. .  We are seeing her at the request of Dr. Zaida Mansfield and Dr. Graham Peres her endocrinologist.  We discussed with the patient switching medication but she absolutely refuses and wants to continue her Depakote which we refilled for her today. She is also seen for needing refills on her medications, and her last seizure was 30 years ago. Patient stopped her Dilantin and is just on Depakote alone, because she was somewhat toxic on her medication of Dilantin and the last time she checked her Depakote level was therapeutic 1 week ago and her level was 77 which was therapeutic. We discussed continuing her medication or possibly discontinuing it, but she prefers to continue it since she can still drive and does not want to risk having another seizure. She's had no other new focal neurological symptoms or problems in the interim. Her form was filled out in detail today for the patient, and we spent 10 minutes counseling the patient on further treatment and evaluation and prognosis with her seizures. 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 She has some osteoporosis with fractures of her left foot and has mild osteopenia on bone density scan, but does have therapeutic vitamin D levels on supplementation. She has taken vitamins and vitamin D for years because she is off Dilantin and on Depakote. She has had side effects on her medications of drowsiness and sedation but is doing well otherwise without any new neurological symptoms as far as weakness, sensory loss, gait problems, vision problems, or seizures. A complete review of systems in symptoms was negative for any other new medical problem, complications or illnesses other than the mild osteopenia and foot fractures. Past Medical History:  
Diagnosis Date  Arthritis  Hyperlipidemia  Hypothyroidism (acquired)  Iron deficiency anemia  Osteopenia 2013  Seizures ([address]  Unspecified epilepsy without mention of intractable epilepsy Past Surgical History:  
Procedure Laterality Date  HX  SECTION Family History Problem Relation Age of Onset  Other Mother   
     fibromyalgia, sarcoidosis  Thyroid Disease Mother   
     hypothyroidism  Other Father 30  
     car accident  Heart Disease Maternal Grandmother  Cancer Maternal Grandmother   
     kidney  Cancer Maternal Grandfather   
     colon Social History Tobacco Use  Smoking status: Former Smoker Packs/day: 1.50 Years: 19.00 Pack years: 28.50 Last attempt to quit: 2003 Years since quittin.1  Smokeless tobacco: Former User Substance Use Topics  Alcohol use: No  
   
Current Outpatient Medications Medication Sig Dispense Refill  amLODIPine (NORVASC) 5 mg tablet  divalproex DR (DEPAKOTE) 250 mg tablet TAKE 3 TABS IN MORNING, 2 IN THE AFTERNOON, AND 3 TABS AT NIGHT 720 Tab 5  clonazePAM (KLONOPIN) 0.5 mg tablet Take 1 Tab by mouth every eight (8) hours as needed (seizure). 30 Tab 11  
 levothyroxine (SYNTHROID) 88 mcg tablet Take 1 Tab by mouth daily. 90 Tab 3  
 hydroCHLOROthiazide (HYDRODIURIL) 12.5 mg tablet Take 1 Tab by mouth daily. (Patient taking differently: Take 25 mg by mouth daily.) 90 Tab 3  cholecalciferol (VITAMIN D3) 1,000 unit cap Take  by mouth daily.  pravastatin (PRAVACHOL) 20 mg tablet 40 mg. Take 1 tab daily  BIOTIN, BULK, by Does Not Apply route daily. Allergies Allergen Reactions  Crestor [Rosuvastatin] Myalgia  Macrobid [Nitrofurantoin Monohyd/M-Cryst] Other (comments)  
  headaches Review of Systems: A comprehensive review of systems was negative except for: Musculoskeletal: positive for myalgias, arthralgias, stiff joints and bone pain Neurological: positive for seizures Objective: I 
 
 
NEUROLOGICAL EXAM: 
 
Appearance: The patient is well developed, well nourished, provides a coherent history and is in no acute distress Patient is mildly overweight Mental Status: Oriented to time, place and person and the present, cognitive function is normal and speech is fluent. Mood and affect appropriate. Cranial Nerves:   Intact visual fields. Fundi are benign. BOBO, EOM's full, no nystagmus, no ptosis. Facial sensation is normal. Corneal reflexes are not tested. Facial movement is symmetric. Hearing is normal bilaterally. Palate is midline with normal sternocleidomastoid and trapezius muscles are normal. Tongue is midline. Neck without meningismus or bruits Temporal arteries are not tender or enlarged Motor:  5/5 strength in upper and lower proximal and distal muscles. Normal bulk and tone. No fasciculations. Right leg seems tender and patient has mild edema of her ankle, but no clear signs of DVT Reflexes:   Deep tendon reflexes 2+/4 and symmetrical. 
No Babinski or clonus present Sensory:   Normal to touch, pinprick and vibration and DSS. Gait:  Normal gait. Tremor:   No tremor noted. Cerebellar:  No cerebellar signs present. Neurovascular:  Normal heart sounds and regular rhythm, peripheral pulses decreased, mild 1+ edema of right ankle, and no carotid bruits. Assessment:  
 
Plan:  
 
We discussed with the patient in detail, switching to new medication for her seizure, but she absolutely refuses and wants to continue her Depakote despite her calcium problems Her recent Depakote level was therapeutic, we did not do any labs today. She has not had a seizure in 30 years, but she prefers to continue the medication because she has done so well for all these years, and has so little side effect of the medications now She is to stay off Dilantin and continue Depakote only now Medication refill sent him for patient today 30 minutes spent with patient today, discussing her diagnosis, prognosis, further treatment and therapy and evaluation and filling out her forms, and examining her in detail and reviewing her past records Patient to remain mentally and physically active and take her vitamins and vitamin D daily. Patient will call if any problem followup will be done in one years time or earlier if needed Medications reviewed, side effects trauma with patient, benefits other medicines call with patient. Patient does not want to make any switches in her medications and will continue current doses as prescribed Signed By: Albert Lopez MD   
 February 18, 2019 This note will not be viewable in [address]

## 2019-02-21 ENCOUNTER — OFFICE VISIT (OUTPATIENT)
Dept: ENDOCRINOLOGY | Age: 54
End: 2019-02-21

## 2019-02-21 VITALS
HEART RATE: 77 BPM | WEIGHT: 248.2 LBS | HEIGHT: 66 IN | DIASTOLIC BLOOD PRESSURE: 77 MMHG | SYSTOLIC BLOOD PRESSURE: 140 MMHG | BODY MASS INDEX: 39.89 KG/M2

## 2019-02-21 DIAGNOSIS — E03.9 HYPOTHYROIDISM (ACQUIRED): Primary | ICD-10-CM

## 2019-02-21 DIAGNOSIS — R82.994 HYPERCALCIURIA: ICD-10-CM

## 2019-02-21 DIAGNOSIS — I10 ESSENTIAL HYPERTENSION, BENIGN: ICD-10-CM

## 2019-02-21 DIAGNOSIS — M85.80 OSTEOPENIA, UNSPECIFIED LOCATION: ICD-10-CM

## 2019-02-21 RX ORDER — LOSARTAN POTASSIUM AND HYDROCHLOROTHIAZIDE 12.5; 1 MG/1; MG/1
1 TABLET ORAL DAILY
Qty: 90 TAB | Refills: 3 | Status: SHIPPED | OUTPATIENT
Start: 2019-02-21 | End: 2019-11-12 | Stop reason: RX

## 2019-02-21 RX ORDER — HYDROCHLOROTHIAZIDE 25 MG/1
25 TABLET ORAL DAILY
COMMUNITY
End: 2019-02-21 | Stop reason: ALTCHOICE

## 2019-02-21 NOTE — PATIENT INSTRUCTIONS
1) Your bone density has improved at both the hip and the spine over the past 2 years and now both sites are in the normal range so you won't need another scan for 5 years at this time. 2) We will stop amlodipine and the 25 mg hctz tab. 3) Instead we'll use losartan-hctz 100/12.5 mg 1 tab daily in the morning for blood pressure and controlling your urine calcium. I think your blood calcium gail because of increasing the dose from 12.5 to 25 mg daily so we'll cut back on the dose. Also the amlodipine likely is causing leg swelling so stopping this should help with swelling. 4) Repeat your calcium and kidney test in 1 month. 5) Start monitoring blood pressure about 2-3 times per week at alternating times either in the morning or evening after resting for 5 minutes and sitting upright in a chair with your arm at heart level. Please let me know if you are having readings over 140 on the top number or 90 on the bottom number after 2 weeks on this new med. 6) Your vitamin D is back to normal so keep taking 1000 units daily. 7) Your TSH (thyroid test) is perfect so I will keep your dose the same. 8) I will plan on seeing you back in one year but if you feel you need to have your labs checked sooner, please let me know.

## 2019-02-21 NOTE — PROGRESS NOTES
Chief Complaint   Patient presents with    Thyroid Problem     pcp and pharmacy confirmed     History of Present Illness: Margoth Lara is a 46 y.o. female here for follow up of thyroid. Weight up 3 lbs since last visit in 5/18. When she saw Dr. Alisa Sanchez in 8/18 her calcium was normal at 9.2 but at that visit her hctz was doubled to 25 mg daily. Also her lisinopril has been stopped as this was causing cough and did resolve upon stopping and changed to amlodipine 5 mg daily sometime since last visit by Dr. Rekha Butler. Has been taking 1000 units of D3 daily. Still taking levothyroxine 88 mcg at 2 am everyday and hasn't missed or run out. Has had more right leg swelling but it's unclear if the amlodipine is making this worse as she does admit to eating more salt than she should. No falls or fractures. Current Outpatient Medications   Medication Sig    hydroCHLOROthiazide (HYDRODIURIL) 25 mg tablet Take 25 mg by mouth daily.  amLODIPine (NORVASC) 5 mg tablet Take 5 mg by mouth daily.  divalproex DR (DEPAKOTE) 250 mg tablet TAKE 3 TABS IN MORNING, 2 IN THE AFTERNOON, AND 3 TABS AT NIGHT    clonazePAM (KLONOPIN) 0.5 mg tablet Take 1 Tab by mouth every eight (8) hours as needed (seizure).  levothyroxine (SYNTHROID) 88 mcg tablet Take 1 Tab by mouth daily.  cholecalciferol (VITAMIN D3) 1,000 unit cap Take  by mouth daily.  pravastatin (PRAVACHOL) 20 mg tablet 40 mg. Take 1 tab daily    BIOTIN, BULK, by Does Not Apply route daily. No current facility-administered medications for this visit.       Allergies   Allergen Reactions    Crestor [Rosuvastatin] Myalgia    Lisinopril Cough    Macrobid [Nitrofurantoin Monohyd/M-Cryst] Other (comments)     headaches     Review of Systems:  - Cardiovascular: no chest pain  - Neurological: no tremors  - Integumentary: skin is normal    Physical Examination:  Blood pressure 140/77, pulse 77, height 5' 6\" (1.676 m), weight 248 lb 3.2 oz (112.6 kg).  - General: pleasant, no distress, good eye contact   - Neck: small goiter, no carotid bruits  - Cardiovascular: regular, normal rate, nl s1 and s2, no m/r/g   - Respiratory: clear to auscultation bilaterally   - Integumentary: skin is normal, 1+ edema of right leg  - Neurological: reflexes 2+ at biceps, mild tremor  - Psychiatric: normal mood and affect    Data Reviewed:   Component      Latest Ref Rng & Units 2/5/2019 2/5/2019 2/5/2019 2/5/2019          11:15 AM 11:15 AM 11:15 AM 11:15 AM   Glucose      65 - 99 mg/dL    93   BUN      6 - 24 mg/dL    22   Creatinine      0.57 - 1.00 mg/dL    0.87   GFR est non-AA      >59 mL/min/1.73    76   GFR est AA      >59 mL/min/1.73    87   BUN/Creatinine ratio      9 - 23    25 (H)   Sodium      134 - 144 mmol/L    143   Potassium      3.5 - 5.2 mmol/L    4.1   Chloride      96 - 106 mmol/L    102   CO2      20 - 29 mmol/L    21   Calcium      8.7 - 10.2 mg/dL    10.4 (H)   VITAMIN D, 25-HYDROXY      30.0 - 100.0 ng/mL   36.2    T4, Free      0.82 - 1.77 ng/dL  1.50     TSH      0.450 - 4.500 uIU/mL 1.730        DXA Scan:  - L-spine: T-score 0.8 (1.9% increase since 1/17)  - Total left hip: T-score 0.1 (3.1% increase since 1/17)  - Left femoral neck: T-score -0.3 (8.9% increase since 1/17)    Assessment/Plan:     1. Osteopenia: She was diagnosed with this based on DXA at Dr. Coral Campos office in Nov 2012 that showed November 2012 that showed T-score at her lumbar spine was -1.2 which was consistent with osteopenia, -0.4 at the left total hip which was normal and -1.4 at the left femoral neck which was also consistent with osteopenia and started developing stress fractures in her feet. She has risk factors for low bone density of an almost 30 pack year history of smoking, chronic anti-seizure meds with dilantin and depakote, intermittent courses of prednisone 1-2 times a year for sinus issues and also being in a post-menopausal state.   She was started on alendronate 70 mg weekly by PCP and repeat DXA in 1/17 showed:- L-spine: T-score -1.0 (2.3% increase since 2012), left total hip: T-score -0.1 (3% increase since 2012), and left femoral neck: T-score -0.9 (8.6% increase since 2012) so I stopped the alendronate as everything was back to normal at that time. Repeat DXA in 2/19 showed T-score of 0.8 at L-spine (1.9% increase since 1/17), 0.1 at left total hip (3.1% increase since 1/17) and -0.3 at left fem neck (8.9% increase since 1/17). Given everything remains normal, will repeat a scan in 5 years. - repeat DXA in 2/24 at [place] [address]. Hypothyroidism (acquired): TSH 2.019 in 6/16 on levothyroxine 75 mcg daily but had gained 8 lbs since then and TSH was 2.79 in 1/17 so increased her dose to 88 mcg daily but had been taking with food and coffee and TSH 2.15 in 5/17 so had her separate from food and coffee but only down to 2.02 in 11/17 so wanted her to change to brand unithroid in case her levels are fluctuating on the generic but she decided not to do this as she felt well and TSH still 1.79 in 5/18 and 1.73 in 2/19 so kept  her dose the same. - cont levothyroxine 88 mcg daily   - check TSH and free T4 prior to next visit      3. Hypercalciuria: was found to have a 24 hour urine calcium of 495 in 10/16 by Dr. Maggie Richardson. She decided to stop her calcium supplements at that time. Her vitamin D was normal at 38 in 10/16 and her PTH was normal at 30. Her repeat was still high at 361 in 1/17 so I added hctz 12.5 mg daily and down to 257 in 4/17 so continued this dose and repeat in 5/18 was still 178 so will stay on this dose and repeat in 2 years. Her serum calcium was 10.4 in 5/17 so stopped her vitamin D 1000 units and mvi daily and back to 10.0 in 11/17. D level down to 25 in 5/18 so added back 1000 of D3 daily and level up to 36 in 2/19.   However calcium gail from 9.2 in 8/18 to 10.4 in 2/19 after hctz was increased from 12.5 mg to 25 mg daily so will decrease her dose back to 12.5 mg daily  - change to losartan-hct 100/12.5 mg daily   - cont vitamin D 1000 units daily  - collect 24 hour urine in 5/20  - check bmp and vitamin D 25-OH prior to next visit      4. Hypertension: her BP was above goal < 140/90 and having swelling on amlodipine and higher calcium on 25 mg of hctz so will stop both meds and change to losartan/hctz instead  - change to losartan-hct 100/12.5 mg daily  - check bmp in 1 month  - monitor home blood pressure readings        Patient Instructions   1) Your bone density has improved at both the hip and the spine over the past 2 years and now both sites are in the normal range so you won't need another scan for 5 years at this time. 2) We will stop amlodipine and the 25 mg hctz tab. 3) Instead we'll use losartan-hctz 100/12.5 mg 1 tab daily in the morning for blood pressure and controlling your urine calcium. I think your blood calcium gail because of increasing the dose from 12.5 to 25 mg daily so we'll cut back on the dose. Also the amlodipine likely is causing leg swelling so stopping this should help with swelling. 4) Repeat your calcium and kidney test in 1 month. 5) Start monitoring blood pressure about 2-3 times per week at alternating times either in the morning or evening after resting for 5 minutes and sitting upright in a chair with your arm at heart level. Please let me know if you are having readings over 140 on the top number or 90 on the bottom number after 2 weeks on this new med. 6) Your vitamin D is back to normal so keep taking 1000 units daily. 7) Your TSH (thyroid test) is perfect so I will keep your dose the same. 8) I will plan on seeing you back in one year but if you feel you need to have your labs checked sooner, please let me know. Follow-up Disposition:  Return in about 1 year (around 2/21/2020).     Copy sent to:  Dr. Yessy Baldwin via [facility]  Dr. John Orellana follow up: 3/29/19  Component      Latest Ref Rng & Units 3/28/2019          11:49 AM   Glucose      65 - 99 mg/dL 167 (H)   BUN      6 - 24 mg/dL 16   Creatinine      0.57 - 1.00 mg/dL 0.58   GFR est non-AA      >59 mL/min/1.73 105   GFR est AA      >59 mL/min/1.73 121   BUN/Creatinine ratio      9 - 23 28 (H)   Sodium      134 - 144 mmol/L 141   Potassium      3.5 - 5.2 mmol/L 4.4   Chloride      96 - 106 mmol/L 102   CO2      20 - 29 mmol/L 24   Calcium      8.7 - 10.2 mg/dL 9.6     Sent her the following message through [employer]:  Your calcium was back to normal with cutting back on the hctz to 12.5 mg daily so stay on this dose.  -------------------------------------------------------------------------------------------------------------------  BUN and creatinine are markers of kidney function. Your values are normal.  -------------------------------------------------------------------------------------------------------------------  Your blood sugar (glucose) was high at 167. I didn't ask you to fast but even for a non-fasting sugar this is not normal as a random sugar should be under 140. Have you ever been told that your sugar is on the borderline for diabetes by Dr. Maranda Fernandez as the highest random sugar I have ever had for you was 114 over the past few years and all the rest have been 77-93? Any other reason for a high sugar yesterday such as infection or pain or severe stress? Let me know when you have a chance. Addendum: 3/29/19    We had the following e-mail exchange:    Celestina Angel. Thanks for letting me know. I would recommend cutting back on your carb intake to help prevent any spikes in your sugar.  ===View-only below this line===      ----- Message -----     From: Rubi Mitchell     Sent: 3/29/2019  8:50 AM EDT       To: Bina Love MD  Subject: RE:lab results    I was not fasting. ...  I had a steak biscuit from [employer] and one cup of coffee with Splenda and half and half in it.    ----- Message -----  From: Sukhi Grijalva MD  Sent: 3/29/2019  8:40 AM EDT  To: Shant Johnston Kristian  Subject: RE:lab results  But to clarify, were you fasting when this lab was drawn or had you had cake the night before AND had some breakfast that had sugar in it? If you were fasting, this actually meets criteria for full blown diabetes but if not fasting, then this is still in the borderline range so clarify this when you can. Either way, please start cutting back on your portions of starchy foods and drinks such as bread, rice, potato, pasta, fruit, sweets, regular soda, sweet tea, juice and any other sweetened beverages. ----- Message -----     From: Francesca Aguila     Sent: 3/29/2019  8:01 AM EDT       To: Sukhi Grijalva MD  Subject: RE:lab results    I am not a \"sweet\" eater however we had a birthday party and I ate cake the night before. I have always had low blood sugar. ... was diagnosed with it when I was very young. Anyway, that's probably what happened.

## 2019-03-29 LAB
BUN SERPL-MCNC: 16 MG/DL (ref 6–24)
BUN/CREAT SERPL: 28 (ref 9–23)
CALCIUM SERPL-MCNC: 9.6 MG/DL (ref 8.7–10.2)
CHLORIDE SERPL-SCNC: 102 MMOL/L (ref 96–106)
CO2 SERPL-SCNC: 24 MMOL/L (ref 20–29)
CREAT SERPL-MCNC: 0.58 MG/DL (ref 0.57–1)
GLUCOSE SERPL-MCNC: 167 MG/DL (ref 65–99)
POTASSIUM SERPL-SCNC: 4.4 MMOL/L (ref 3.5–5.2)
SODIUM SERPL-SCNC: 141 MMOL/L (ref 134–144)

## 2019-04-29 RX ORDER — HYDROCHLOROTHIAZIDE 12.5 MG/1
TABLET ORAL
Qty: 90 TAB | Refills: 3 | OUTPATIENT
Start: 2019-04-29

## 2019-11-12 ENCOUNTER — TELEPHONE (OUTPATIENT)
Dept: ENDOCRINOLOGY | Age: 54
End: 2019-11-12

## 2019-11-12 RX ORDER — HYDROCHLOROTHIAZIDE 12.5 MG/1
12.5 TABLET ORAL DAILY
Qty: 90 TAB | Refills: 3 | Status: SHIPPED | OUTPATIENT
Start: 2019-11-12 | End: 2020-10-22

## 2019-11-12 RX ORDER — LOSARTAN POTASSIUM 100 MG/1
100 TABLET ORAL DAILY
Qty: 90 TAB | Refills: 3 | Status: SHIPPED | OUTPATIENT
Start: 2019-11-12 | End: 2020-10-22

## 2019-11-12 NOTE — TELEPHONE ENCOUNTER
I spoke with Petey Crowe the pharmacist at [facility] and he stated that they received the rx for the HCTZ and for the Losartan. He stated that the medication was in stock and that they will call the pt once the rx is ready for pickup. Pt was made aware of the reason for the change in the rx and that someone from [facility] will call once the rx is ready for pickup.

## 2019-11-12 NOTE — TELEPHONE ENCOUNTER
Please call them and see if they have the individual losartan 100 mg and hctz 12.5 mg tabs to take 1 tab daily of each. I sent a prescription to the pharmacy for these to see if they will go through. If so, let the patient know of this change. If not, ask whatever meds they have in the ARB class (irbesartan, valsartan or olmesartan).

## 2020-01-01 RX ORDER — LEVOTHYROXINE SODIUM 88 UG/1
TABLET ORAL
Qty: 90 TAB | Refills: 3 | Status: SHIPPED | OUTPATIENT
Start: 2020-01-01 | End: 2020-02-20

## 2020-01-27 ENCOUNTER — TELEPHONE (OUTPATIENT)
Dept: NEUROLOGY | Age: 55
End: 2020-01-27

## 2020-01-27 DIAGNOSIS — G40.209 COMPLEX PARTIAL SEIZURE EVOLVING TO GENERALIZED SEIZURE (HCC): Primary | ICD-10-CM

## 2020-01-27 NOTE — TELEPHONE ENCOUNTER
Patient would like to know if Iljoni Elysia would add a liver profile and she would like to have her depakote levels chk. Please call pt once order has been added

## 2020-01-29 NOTE — TELEPHONE ENCOUNTER
I called and left a message that this has been order per verbal order by Dr. Yasmeen Gardner and put in the mail for her

## 2020-02-06 LAB
25(OH)D3+25(OH)D2 SERPL-MCNC: 35.3 NG/ML (ref 30–100)
ALBUMIN SERPL-MCNC: 4.3 G/DL (ref 3.8–4.9)
ALBUMIN/GLOB SERPL: 1.7 {RATIO} (ref 1.2–2.2)
ALP SERPL-CCNC: 50 IU/L (ref 39–117)
ALT SERPL-CCNC: 34 IU/L (ref 0–32)
AST SERPL-CCNC: 69 IU/L (ref 0–40)
BILIRUB SERPL-MCNC: 0.4 MG/DL (ref 0–1.2)
BUN SERPL-MCNC: 16 MG/DL (ref 6–24)
BUN SERPL-MCNC: 16 MG/DL (ref 6–24)
BUN/CREAT SERPL: 28 (ref 9–23)
BUN/CREAT SERPL: 30 (ref 9–23)
CALCIUM SERPL-MCNC: 10.2 MG/DL (ref 8.7–10.2)
CALCIUM SERPL-MCNC: 9.7 MG/DL (ref 8.7–10.2)
CHLORIDE SERPL-SCNC: 100 MMOL/L (ref 96–106)
CHLORIDE SERPL-SCNC: 102 MMOL/L (ref 96–106)
CO2 SERPL-SCNC: 21 MMOL/L (ref 20–29)
CO2 SERPL-SCNC: 22 MMOL/L (ref 20–29)
CREAT SERPL-MCNC: 0.53 MG/DL (ref 0.57–1)
CREAT SERPL-MCNC: 0.57 MG/DL (ref 0.57–1)
GLOBULIN SER CALC-MCNC: 2.5 G/DL (ref 1.5–4.5)
GLUCOSE SERPL-MCNC: 92 MG/DL (ref 65–99)
GLUCOSE SERPL-MCNC: 96 MG/DL (ref 65–99)
POTASSIUM SERPL-SCNC: 4.1 MMOL/L (ref 3.5–5.2)
POTASSIUM SERPL-SCNC: 4.6 MMOL/L (ref 3.5–5.2)
PROT SERPL-MCNC: 6.8 G/DL (ref 6–8.5)
SODIUM SERPL-SCNC: 138 MMOL/L (ref 134–144)
SODIUM SERPL-SCNC: 141 MMOL/L (ref 134–144)
T4 FREE SERPL-MCNC: 1.39 NG/DL (ref 0.82–1.77)
TSH SERPL DL<=0.005 MIU/L-ACNC: 3.57 UIU/ML (ref 0.45–4.5)
VALPROATE SERPL-MCNC: 75 UG/ML (ref 50–100)

## 2020-02-10 DIAGNOSIS — M85.80 OSTEOPENIA, UNSPECIFIED LOCATION: ICD-10-CM

## 2020-02-10 DIAGNOSIS — R82.994 HYPERCALCIURIA: ICD-10-CM

## 2020-02-10 DIAGNOSIS — E03.9 HYPOTHYROIDISM (ACQUIRED): ICD-10-CM

## 2020-02-20 ENCOUNTER — OFFICE VISIT (OUTPATIENT)
Dept: ENDOCRINOLOGY | Age: 55
End: 2020-02-20

## 2020-02-20 VITALS
HEART RATE: 93 BPM | WEIGHT: 245.2 LBS | BODY MASS INDEX: 39.41 KG/M2 | HEIGHT: 66 IN | DIASTOLIC BLOOD PRESSURE: 48 MMHG | SYSTOLIC BLOOD PRESSURE: 133 MMHG

## 2020-02-20 DIAGNOSIS — I10 ESSENTIAL HYPERTENSION, BENIGN: ICD-10-CM

## 2020-02-20 DIAGNOSIS — E03.9 HYPOTHYROIDISM (ACQUIRED): Primary | ICD-10-CM

## 2020-02-20 DIAGNOSIS — M85.80 OSTEOPENIA, UNSPECIFIED LOCATION: ICD-10-CM

## 2020-02-20 DIAGNOSIS — R82.994 HYPERCALCIURIA: ICD-10-CM

## 2020-02-20 RX ORDER — TIZANIDINE 4 MG/1
TABLET ORAL
COMMUNITY
Start: 2020-02-19 | End: 2021-02-22

## 2020-02-20 RX ORDER — LEVOTHYROXINE SODIUM 100 UG/1
TABLET ORAL
Qty: 90 TAB | Refills: 3 | Status: SHIPPED | OUTPATIENT
Start: 2020-02-20 | End: 2020-05-16

## 2020-02-20 RX ORDER — AMOXICILLIN AND CLAVULANATE POTASSIUM 875; 125 MG/1; MG/1
1 TABLET, FILM COATED ORAL 2 TIMES DAILY
COMMUNITY
Start: 2020-02-18 | End: 2020-02-28

## 2020-02-20 NOTE — PATIENT INSTRUCTIONS
1) TSH is a thyroid test.  Your level is 3.57 which is normal but above goal of 0.5-2.0. This test goes opposite of your thyroid dose and suggests your dose of levothyroxine is not enough. I will increase your dose to 100 mcg daily and have sent a new prescription to your local pharmacy. Feel free to use up the 88 mcg tabs by taking 1 tab on Monday-Saturday and 2 tabs on Sunday. 2) Repeat your labs in 6 weeks to see the effect of this dose increase. 3) Your BUN and creatinine are markers of kidney function. Your values are normal.    4) Your electrolytes (sodium, potassium, and calcium) are all normal.  Your glucose (blood sugar) is normal.    5) Your vitamin D level is 35 which is normal.  Goal is over 30. Please continue to take your current dose of vitamin D to keep your levels at goal.    6) To collect the 24 hour urine properly, the morning you start the collection, let the first void go into the toilet and then collect every time you go over the next 24 hours and keep the jug in the refrigerator. The next morning, collect the first void and this will complete your sample and then bring the jug to the lab that day.

## 2020-02-20 NOTE — LETTER
4/4/2020 4:01 PM 
 
Ms. Renetta Christie [facility] [facility] [id number] Please go to [facility] and have your labs repeated now (if you haven't already by the time you receive this).  The order is already in their system and be sure to ask to have labs drawn that are under Dr. Sabi Robert name.   If you have the actual lab order that I gave you at your last visit or mailed to you (check your glove compartment  or other safe spot where you may keep your medical papers), please bring this to the lab just to be safe in case [employer]'s computer system is down. Carlos Nathan will contact you with the results. You won't need to fast for your labs so it's fine to eat the day of your lab draw. Also if you haven't done your 24 hour urine yet (which you may have by the time you read this), please complete at your earliest convenience.   
 
 
 
 
 
Sincerely, 
 
 
Mildred Partida MD

## 2020-02-20 NOTE — PROGRESS NOTES
Chief Complaint   Patient presents with    Thyroid Problem     pcp and pharmacy confirmed     History of Present Illness: Lachelle Guzman is a 53 y.o. female here for follow up of thyroid. Weight down 3 lbs since last visit in 2/19. Has been off the amlodipine since last visit and on the losartan and hctz. Despite this has still had some swelling in her right leg and is under the care of Dr. Pedro Gil and has this wrapped currently and was x-rayed and no new stress fractures. Still having a lot of pain in her bones but it's been better over the past week with the wrapping. Still taking levothyroxine in the middle of the night and hasn't missed any doses. No other new meds aside from a course of augmentin and zanaflex. Was put on a medrol dose pack for one week with Dr. Pedro Gil but is off this now. Labs were drawn before the medrol dose pack. Still taking vitamin D 1000 units daily. Has had some increase in fatigue. No constipation. Has had some hair loss that is about the same. Some dry skin that is unchanged. Tends to stay hot not cold. Did start the keto diet on 2/1/20 to start losing weight. Current Outpatient Medications   Medication Sig    amoxicillin-clavulanate (AUGMENTIN) 875-125 mg per tablet Take 1 Tab by mouth two (2) times a day.  tiZANidine (ZANAFLEX) 4 mg tablet Take 1 tab as needed for muscle spasms    levothyroxine (SYNTHROID) 88 mcg tablet TAKE 1 TABLET BY MOUTH EVERY DAY    losartan (COZAAR) 100 mg tablet Take 1 Tab by mouth daily. Replaces losartan-hctz 100/12.5 mg tabs    hydroCHLOROthiazide (HYDRODIURIL) 12.5 mg tablet Take 1 Tab by mouth daily. Replaces losartan-hctz 100/12.5 mg tabs    divalproex DR (DEPAKOTE) 250 mg tablet TAKE 3 TABS IN MORNING, 2 IN THE AFTERNOON, AND 3 TABS AT NIGHT    clonazePAM (KLONOPIN) 0.5 mg tablet Take 1 Tab by mouth every eight (8) hours as needed (seizure).  cholecalciferol (VITAMIN D3) 1,000 unit cap Take  by mouth daily.     pravastatin (PRAVACHOL) 20 mg tablet 40 mg. Take 1 tab daily    BIOTIN, BULK, by Does Not Apply route daily. No current facility-administered medications for this visit. Allergies   Allergen Reactions    Crestor [Rosuvastatin] Myalgia    Lisinopril Cough    Macrobid [Nitrofurantoin Monohyd/M-Cryst] Other (comments)     headaches     Review of Systems:  - Cardiovascular: no chest pain  - Neurological: (+) tremor  - Integumentary: skin is normal    Physical Examination:  Blood pressure 133/48, pulse 93, height 5' 6\" (1.676 m), weight 245 lb 3.2 oz (111.2 kg). - General: pleasant, no distress, good eye contact   - Neck: small goiter, no carotid bruits  - Cardiovascular: regular, normal rate, nl s1 and s2, no m/r/g   - Respiratory: clear to auscultation bilaterally   - Integumentary: skin is normal, (+) edema of right leg  - Neurological: reflexes 2+ at biceps, (+) mild tremor  - Psychiatric: normal mood and affect    Data Reviewed:   Component      Latest Ref Rng & Units 2/5/2020 2/5/2020 2/5/2020 2/5/2020          10:44 AM 10:44 AM 10:44 AM 10:44 AM   Glucose      65 - 99 mg/dL   92    BUN      6 - 24 mg/dL   16    Creatinine      0.57 - 1.00 mg/dL   0.57    GFR est non-AA      >59 mL/min/1.73   105    GFR est AA      >59 mL/min/1.73   121    BUN/Creatinine ratio      9 - 23   28 (H)    Sodium      134 - 144 mmol/L   141    Potassium      3.5 - 5.2 mmol/L   4.1    Chloride      96 - 106 mmol/L   102    CO2      20 - 29 mmol/L   21    Calcium      8.7 - 10.2 mg/dL   9.7    VITAMIN D, 25-HYDROXY      30.0 - 100.0 ng/mL    35.3   TSH      0.450 - 4.500 uIU/mL  3.570     T4, Free      0.82 - 1.77 ng/dL 1.39          Assessment/Plan:     1.  Osteopenia: She was diagnosed with this based on DXA at Dr. Ayse Sarah office in Nov 2012 that showed November 2012 that showed T-score at her lumbar spine was -1.2 which was consistent with osteopenia, -0.4 at the left total hip which was normal and -1.4 at the left femoral neck which was also consistent with osteopenia and started developing stress fractures in her feet. She has risk factors for low bone density of an almost 30 pack year history of smoking, chronic anti-seizure meds with dilantin and depakote, intermittent courses of prednisone 1-2 times a year for sinus issues and also being in a post-menopausal state. She was started on alendronate 70 mg weekly by PCP and repeat DXA in 1/17 showed:- L-spine: T-score -1.0 (2.3% increase since 2012), left total hip: T-score -0.1 (3% increase since 2012), and left femoral neck: T-score -0.9 (8.6% increase since 2012) so I stopped the alendronate as everything was back to normal at that time. Repeat DXA in 2/19 showed T-score of 0.8 at L-spine (1.9% increase since 1/17), 0.1 at left total hip (3.1% increase since 1/17) and -0.3 at left fem neck (8.9% increase since 1/17). Given everything remains normal, will repeat a scan in 5 years. - repeat DXA in 2/24 at [place] [address]. Hypothyroidism (acquired): TSH 2.019 in 6/16 on levothyroxine 75 mcg daily but had gained 8 lbs since then and TSH was 2.79 in 1/17 so increased her dose to 88 mcg daily but had been taking with food and coffee and TSH 2.15 in 5/17 so had her separate from food and coffee but only down to 2.02 in 11/17 so wanted her to change to brand unithroid in case her levels are fluctuating on the generic but she decided not to do this as she felt well and TSH still 1.79 in 5/18 and 1.73 in 2/19 so kept her dose the same. TSH up to 3.57 in 2/20 so increased dose to 100 mcg daily. - increase levothyroxine to 100 mcg daily   - check TSH and free T4 in 6 weeks and prior to next visit      3. Hypercalciuria: was found to have a 24 hour urine calcium of 495 in 10/16 by Dr. Nevin Novak. She decided to stop her calcium supplements at that time. Her vitamin D was normal at 38 in 10/16 and her PTH was normal at 30.   Her repeat was still high at 361 in 1/17 so I added hctz 12.5 mg daily and down to 257 in 4/17 so continued this dose and repeat in 5/18 was still 178 so will stay on this dose and repeat in 2 years. Her serum calcium was 10.4 in 5/17 so stopped her vitamin D 1000 units and mvi daily and back to 10.0 in 11/17. D level down to 25 in 5/18 so added back 1000 of D3 daily and level up to 36 in 2/19. However calcium gail from 9.2 in 8/18 to 10.4 in 2/19 after hctz was increased from 12.5 mg to 25 mg daily so decreased her dose back to 12.5 mg daily and calcium 9.6 in 3/19 and 9.7 in 2/20. Vitamin D 35 in 2/20  - cont losartan 100 mg daily  - cont hctz 12.5 mg daily   - cont vitamin D 1000 units daily  - collect 24 hour urine sometime this spring  - check bmp and vitamin D 25-OH prior to next visit      4. Hypertension: her BP was at goal < 140/90   - cont current regimen  - monitor home blood pressure readings    Patient Instructions   1) TSH is a thyroid test.  Your level is 3.57 which is normal but above goal of 0.5-2.0. This test goes opposite of your thyroid dose and suggests your dose of levothyroxine is not enough. I will increase your dose to 100 mcg daily and have sent a new prescription to your local pharmacy. Feel free to use up the 88 mcg tabs by taking 1 tab on Monday-Saturday and 2 tabs on Sunday. 2) Repeat your labs in 6 weeks to see the effect of this dose increase. 3) Your BUN and creatinine are markers of kidney function. Your values are normal.    4) Your electrolytes (sodium, potassium, and calcium) are all normal.  Your glucose (blood sugar) is normal.    5) Your vitamin D level is 35 which is normal.  Goal is over 30. Please continue to take your current dose of vitamin D to keep your levels at goal.    6) To collect the 24 hour urine properly, the morning you start the collection, let the first void go into the toilet and then collect every time you go over the next 24 hours and keep the jug in the refrigerator.   The next morning, collect the first void and this will complete your sample and then bring the jug to the lab that day. Follow-up and Dispositions    · Return in about 1 year (around 2/20/2021). Copy sent to:  Dr. Harini Beasley via [facility]  Dr. Breonna Blackwood follow up: 5/15/20  Component      Latest Ref Rng & Units 5/14/2020 5/14/2020 5/14/2020           9:39 AM  9:30 AM  9:30 AM   Calcium,urine mg/dL      Not Estab. mg/dL 13.9     Calcium mg/24 hr      47 - 462 mg/24 hr 181     T4, Free      0.82 - 1.77 ng/dL   1.46   TSH      0.450 - 4.500 uIU/mL  2.220      Sent her the following message through [employer]:  TSH is a thyroid test.  Your level is 2.22 which is normal but just above goal of 0.5-2.0. This test goes opposite of your thyroid dose and suggests your dose of levothyroxine is adequate but possibly not enough. How are you feeling on the current dose? If feeling well, I will keep your dose the same but if you are still having any fatigue or hair loss, I'm happy to try increasing to 112 mcg daily. Let me know when you have a chance. -------------------------------------------------------------------------------------------------------------------  Your 24 hour urine remains normal at 181 so keep taking hctz 12.5 mg daily. Addendum: 5/16/20    We had the following [employer] exchange:    If this is the case, let's try increasing the dose to 112 mcg daily and I sent this to [facility].  Feel free to use up the 100 mcg tabs by taking 1 tab on Monday-Saturday and 2 tabs on Sunday.    ===View-only below this line===      ----- Message -----       From:Rosa Townsend       Sent:5/16/2020  7:06 AM EDT         To:Emerson Leong MD    Subject:RE:lab results    hair loss and fatigue the same. ... don't know if that's thyroid or not. If you think it should be increased I will get from pharmacy and start the new dose asap. Thank you.    Trino Soriano

## 2020-03-18 DIAGNOSIS — G40.209 COMPLEX PARTIAL SEIZURE EVOLVING TO GENERALIZED SEIZURE (HCC): ICD-10-CM

## 2020-03-18 DIAGNOSIS — E03.4 HYPOTHYROIDISM DUE TO ACQUIRED ATROPHY OF THYROID: ICD-10-CM

## 2020-03-18 RX ORDER — DIVALPROEX SODIUM 250 MG/1
TABLET, DELAYED RELEASE ORAL
Qty: 720 TAB | Refills: 5 | Status: SHIPPED | OUTPATIENT
Start: 2020-03-18 | End: 2021-02-05 | Stop reason: SDUPTHER

## 2020-03-20 ENCOUNTER — TELEPHONE (OUTPATIENT)
Dept: NEUROLOGY | Age: 55
End: 2020-03-20

## 2020-03-20 DIAGNOSIS — E03.4 HYPOTHYROIDISM DUE TO ACQUIRED ATROPHY OF THYROID: ICD-10-CM

## 2020-03-20 DIAGNOSIS — G40.209 COMPLEX PARTIAL SEIZURE EVOLVING TO GENERALIZED SEIZURE (HCC): ICD-10-CM

## 2020-03-20 RX ORDER — CLONAZEPAM 0.5 MG/1
0.5 TABLET ORAL
Qty: 30 TAB | Refills: 5 | Status: SHIPPED | OUTPATIENT
Start: 2020-03-20 | End: 2021-02-07

## 2020-03-20 NOTE — TELEPHONE ENCOUNTER
Patient rescheduled her routine appointment and would like to know if Dr. Chavo Stiles will send in her klonopin that he usually writes for her.  She does not regularly and last fill per her  was 7/22/2019    Please send in to the pharmacy if warranted for patient

## 2020-04-02 DIAGNOSIS — I10 ESSENTIAL HYPERTENSION, BENIGN: ICD-10-CM

## 2020-04-02 DIAGNOSIS — R82.994 HYPERCALCIURIA: ICD-10-CM

## 2020-04-02 DIAGNOSIS — E03.9 HYPOTHYROIDISM (ACQUIRED): ICD-10-CM

## 2020-04-02 DIAGNOSIS — M85.80 OSTEOPENIA, UNSPECIFIED LOCATION: ICD-10-CM

## 2020-05-15 LAB
CALCIUM 24H UR-MCNC: 13.9 MG/DL
CALCIUM 24H UR-MRATE: 181 MG/24 HR (ref 47–462)
T4 FREE SERPL-MCNC: 1.46 NG/DL (ref 0.82–1.77)
TSH SERPL DL<=0.005 MIU/L-ACNC: 2.22 UIU/ML (ref 0.45–4.5)

## 2020-05-16 RX ORDER — LEVOTHYROXINE SODIUM 112 UG/1
TABLET ORAL
Qty: 90 TAB | Refills: 3 | Status: SHIPPED | OUTPATIENT
Start: 2020-05-16 | End: 2021-05-02

## 2020-05-30 ENCOUNTER — HOSPITAL ENCOUNTER (OUTPATIENT)
Dept: CT IMAGING | Age: 55
Discharge: HOME | End: 2020-05-30
Attending: FAMILY MEDICINE
Payer: COMMERCIAL

## 2020-05-30 DIAGNOSIS — M84.374A STRESS FRACTURE OF RIGHT FOOT, INITIAL ENCOUNTER: ICD-10-CM

## 2020-05-30 DIAGNOSIS — M79.671 RIGHT FOOT PAIN: ICD-10-CM

## 2020-05-30 PROCEDURE — 73700 CT LOWER EXTREMITY W/O DYE: CPT

## 2020-08-27 NOTE — PATIENT INSTRUCTIONS
A Healthy Lifestyle: Care Instructions Your Care Instructions A healthy lifestyle can help you feel good, stay at a healthy weight, and have plenty of energy for both work and play. A healthy lifestyle is something you can share with your whole family. A healthy lifestyle also can lower your risk for serious health problems, such as high blood pressure, heart disease, and diabetes. You can follow a few steps listed below to improve your health and the health of your family. Follow-up care is a key part of your treatment and safety. Be sure to make and go to all appointments, and call your doctor if you are having problems. It's also a good idea to know your test results and keep a list of the medicines you take. How can you care for yourself at home? · Do not eat too much sugar, fat, or fast foods. You can still have dessert and treats now and then. The goal is moderation. · Start small to improve your eating habits. Pay attention to portion sizes, drink less juice and soda pop, and eat more fruits and vegetables. ? Eat a healthy amount of food. A 3-ounce serving of meat, for example, is about the size of a deck of cards. Fill the rest of your plate with vegetables and whole grains. ? Limit the amount of soda and sports drinks you have every day. Drink more water when you are thirsty. ? Eat at least 5 servings of fruits and vegetables every day. It may seem like a lot, but it is not hard to reach this goal. A serving or helping is 1 piece of fruit, 1 cup of vegetables, or 2 cups of leafy, raw vegetables. Have an apple or some carrot sticks as an afternoon snack instead of a candy bar. Try to have fruits and/or vegetables at every meal. 
· Make exercise part of your daily routine. You may want to start with simple activities, such as walking, bicycling, or slow swimming. Try to be active 30 to 60 minutes every day.  You do not need to do all 30 to 60 Patient notified of lab results and Dr. Julien Franco. Jada's instructions. Appointment for recheck with Dr. Mary Weaver scheduled for October 13th as advised. Patient answered yes to three screening questions:     Abdominal pain and diarrhea for several weeks. minutes all at once. For example, you can exercise 3 times a day for 10 or 20 minutes. Moderate exercise is safe for most people, but it is always a good idea to talk to your doctor before starting an exercise program. 
· Keep moving. [employer] the lawn, work in the garden, or [employer]. Take the stairs instead of the elevator at work. · If you smoke, quit. People who smoke have an increased risk for heart attack, stroke, cancer, and other lung illnesses. Quitting is hard, but there are ways to boost your chance of quitting tobacco for good. ? Use nicotine gum, patches, or lozenges. ? Ask your doctor about stop-smoking programs and medicines. ? Keep trying. In addition to reducing your risk of diseases in the future, you will notice some benefits soon after you stop using tobacco. If you have shortness of breath or asthma symptoms, they will likely get better within a few weeks after you quit. · Limit how much alcohol you drink. Moderate amounts of alcohol (up to 2 drinks a day for men, 1 drink a day for women) are okay. But drinking too much can lead to liver problems, high blood pressure, and other health problems. Family health If you have a family, there are many things you can do together to improve your health. · Eat meals together as a family as often as possible. · Eat healthy foods. This includes fruits, vegetables, lean meats and dairy, and whole grains. · Include your family in your fitness plan. Most people think of activities such as jogging or tennis as the way to fitness, but there are many ways you and your family can be more active. Anything that makes you breathe hard and gets your heart pumping is exercise. Here are some tips: 
? Walk to do errands or to take your child to school or the bus. 
? Go for a family bike ride after dinner instead of watching TV. Where can you learn more? Go to http://jonathan-louise.info/. Enter G488 in the search box to learn more about \"A Healthy Lifestyle: Care Instructions. \" Current as of: September 11, 2018 Content Version: 11.9 © 2282-1741 [employer]. Care instructions adapted under license by "[employer] (which disclaims liability or warranty for this information). If you have questions about a medical condition or this instruction, always ask your healthcare professional. [address] any warranty or liability for your use of this information. Office Policieso Phone calls/patient messages: Please allow up to 24 hours for someone in the office to contact you about your call or message. Be mindful your provider may be out of the office or your message may require further review. We encourage you to use [employer] for your messages as this is a faster, more efficient way to communicate with our office 
o Medication Refills: 
Prescription medications require up to 48 business hours to process. We encourage you to use [employer] for your refills. For controlled medications: Please allow up to 72 business hours to process. Certain medications may require you to  a written prescription at our office. NO narcotic/controlled medications will be prescribed after 4pm Monday through Friday or on weekends 
o Form/Paperwork Completion: We ask that you allow 7-14 business days. You may also download your forms to [employer] to have your doctor print off.

## 2020-10-22 RX ORDER — LOSARTAN POTASSIUM 100 MG/1
TABLET ORAL
Qty: 90 TAB | Refills: 3 | Status: SHIPPED | OUTPATIENT
Start: 2020-10-22 | End: 2021-10-27 | Stop reason: SDUPTHER

## 2020-10-22 RX ORDER — HYDROCHLOROTHIAZIDE 12.5 MG/1
TABLET ORAL
Qty: 90 TAB | Refills: 3 | Status: SHIPPED | OUTPATIENT
Start: 2020-10-22 | End: 2021-10-27 | Stop reason: SDUPTHER

## 2021-02-05 ENCOUNTER — VIRTUAL VISIT (OUTPATIENT)
Dept: NEUROLOGY | Age: 56
End: 2021-02-05
Payer: COMMERCIAL

## 2021-02-05 DIAGNOSIS — G40.209 COMPLEX PARTIAL SEIZURE EVOLVING TO GENERALIZED SEIZURE (HCC): ICD-10-CM

## 2021-02-05 DIAGNOSIS — E03.4 HYPOTHYROIDISM DUE TO ACQUIRED ATROPHY OF THYROID: ICD-10-CM

## 2021-02-05 PROCEDURE — 99213 OFFICE O/P EST LOW 20 MIN: CPT | Performed by: PSYCHIATRY & NEUROLOGY

## 2021-02-05 RX ORDER — DIVALPROEX SODIUM 250 MG/1
TABLET, DELAYED RELEASE ORAL
Qty: 720 TAB | Refills: 5 | Status: SHIPPED | OUTPATIENT
Start: 2021-02-05 | End: 2022-02-08

## 2021-02-05 NOTE — LETTER
2021 8:07 PM 
 
Patient:  Miriam Monson YOB: 1965 Date of Visit: 2021 Dear No Recipients: Thank you for referring Ms. Little Robert to me for evaluation/treatment. Below are the relevant portions of my assessment and plan of care. Consult Miriam Monson is a 63 y.o. female who was seen by synchronous (real-time) audio-video technology on 2021. REFERRED BY: 
Lee Cardona MD 
 
CHIEF COMPLAINT: Seizures Subjective:  
 
Miriam Monson is a 63 y.o. right-handed  female, seen for follow-up of her seizures, and the patient currently takes 750 mg of Depakote in the morning, 500 at lunch, and 750 mg at night, and had a recent Depakote level done that was 75 by her PCP, and her last seizure was 33 years ago and she remained under good control. She was previously on Dilantin went off of that because of osteoporosis, and is doing well with her endocrinologist and that has been corrected. She questions whether she should get the vaccine, and we strongly advised her to get the vaccine as soon as possible when it was her time. 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 She is doing well on Depakote. Routine metabolic studies are drawn today and her Depakote was renewed also for her. Has had no other new medical problems, neurological problems, and continues to do well. We discussed the possible benefits of coming off the medication, but she would prefer to take the medication because she is done so well for so long and does not want to risk having a seizure. Past Medical History:  
Diagnosis Date  Arthritis  Hyperlipidemia  Hypothyroidism (acquired)  Iron deficiency anemia  Osteopenia 2013  Seizures ([facility] [address]  Unspecified epilepsy without mention of intractable epilepsy Past Surgical History:  
Procedure Laterality Date  HX  SECTION    
 
 Family History Problem Relation Age of Onset  Other Mother   
     fibromyalgia, sarcoidosis  Thyroid Disease Mother   
     hypothyroidism  Other Father 30  
     car accident  Heart Disease Maternal Grandmother  Cancer Maternal Grandmother   
     kidney  Cancer Maternal Grandfather   
     colon Social History Tobacco Use  Smoking status: Former Smoker Packs/day: 1.50 Years: 19.00 Pack years: 28.50 Quit date: 2003 Years since quittin.0  Smokeless tobacco: Former User Substance Use Topics  Alcohol use: No  
   
 
Current Outpatient Medications:  
  divalproex DR (DEPAKOTE) 250 mg tablet, 3 p.o. every morning, 2 p.o. at lunchtime, and 3 p.o. at nighttime, Disp: 720 Tab, Rfl: 5 
  losartan (COZAAR) 100 mg tablet, TAKE 1 TABLET BY MOUTH DAILY. REPLACES LOSARTAN-HCTZ 100/12.5 MG TABS, Disp: 90 Tab, Rfl: 3 
  hydroCHLOROthiazide (HYDRODIURIL) 12.5 mg tablet, TAKE 1 TABLET BY MOUTH DAILY. REPLACES LOSARTAN-HCTZ 100/12.5 MG TABS, Disp: 90 Tab, Rfl: 3 
  levothyroxine (SYNTHROID) 112 mcg tablet, TAKE 1 TABLET BY MOUTH EVERY DAY--Delete 100 mcg dose from profile, Disp: 90 Tab, Rfl: 3 
  clonazePAM (KlonoPIN) 0.5 mg tablet, Take 1 Tab by mouth every eight (8) hours as needed (seizure). , Disp: 30 Tab, Rfl: 5 
  tiZANidine (ZANAFLEX) 4 mg tablet, Take 1 tab as needed for muscle spasms, Disp: , Rfl:  
  cholecalciferol (VITAMIN D3) 1,000 unit cap, Take  by mouth daily. , Disp: , Rfl:  
  pravastatin (PRAVACHOL) 20 mg tablet, 40 mg. Take 1 tab daily, Disp: , Rfl:  
  BIOTIN, BULK,, by Does Not Apply route daily. , Disp: , Rfl:  
 
 
 
Allergies Allergen Reactions  Crestor [Rosuvastatin] Myalgia  Lisinopril Cough  Macrobid [Nitrofurantoin Monohyd/M-Cryst] Other (comments)  
  headaches MRI Results (most recent): 
Results from Hospital Encounter encounter on 12 MRI FOOT LT WO CONT  Narrative **Final Report** 
  
 
 ICD Codes / Adm. Diagnosis: 729.5  845.10 / Pain in limb  Sprain of foot,  
unspecified si Examination:   FOOT WO CON LT  - [id number] - Dec 17 2012  1:07PM 
Accession No:  [id number] Reason:  FOOT SPRAIN, FOOT REGION: Whole REPORT: 
INDICATION: Left foot pain, sprain COMPARISON: None EXAM: Sagittal T1-weighted spin-echo and fat-suppressed T2-weighted fast  
spin-echo, sagittal inversion recovery, and short axis and long axis TI-weighted spin-echo and fat-suppressed T2-weighted fast spin-echo MR  
images of the left forefoot are obtained. FINDINGS: There is a moderately prominent subcutaneous edema dorsal to the  
third and fourth metatarsal bones. There is diffusely abnormal signal of  
the third and fourth metatarsal distal diaphyses, necks and heads, with  
parosteal edema-like signal most prominently demonstrated laterally at the  
neck of the third metatarsal bone. There is a small effusion of the third  
metatarsophalangeal joint. No tendon derangement is evident nor is there  
bone or soft tissue mass. IMPRESSION: Edema like signal of the third and fourth metatarsal shafts  
through heads. These findings could represent fractures or other causes for  
osseous edema including osteomyelitis. Clinical correlation recommended. Signing/Reading Doctor: Fredna Cooks. Sagrario Yuan ([id number]) Approved: MAURICIO Yuan ([id number])  Dec 17 2012  1:18PM                       
 
 
   
 
 
Results from Hospital Encounter encounter on 12/17/12 MRI FOOT LT WO CONT Narrative **Final Report** 
  
 
ICD Codes / Adm. Diagnosis: 729.5  845.10 / Pain in limb  Sprain of foot,  
unspecified si Examination:   FOOT WO CON LT  - [id number] - Dec 17 2012  1:07PM 
Accession No:  [id number] Reason:  FOOT SPRAIN, FOOT REGION: Whole REPORT: 
INDICATION: Left foot pain, sprain COMPARISON: None EXAM: Sagittal T1-weighted spin-echo and fat-suppressed T2-weighted fast  
 spin-echo, sagittal inversion recovery, and short axis and long axis TI-weighted spin-echo and fat-suppressed T2-weighted fast spin-echo MR  
images of the left forefoot are obtained. FINDINGS: There is a moderately prominent subcutaneous edema dorsal to the  
third and fourth metatarsal bones. There is diffusely abnormal signal of  
the third and fourth metatarsal distal diaphyses, necks and heads, with  
parosteal edema-like signal most prominently demonstrated laterally at the  
neck of the third metatarsal bone. There is a small effusion of the third  
metatarsophalangeal joint. No tendon derangement is evident nor is there  
bone or soft tissue mass. IMPRESSION: Edema like signal of the third and fourth metatarsal shafts  
through heads. These findings could represent fractures or other causes for  
osseous edema including osteomyelitis. Clinical correlation recommended. Signing/Reading Doctor: Genoveva Elizabeth. Sondra Mchugh ([id number]) Approved: MAURICIO Mchugh ([id number])  Dec 17 2012  1:18PM                       
 
 
   
 
Review of Systems: A comprehensive review of systems was negative except for: Neurological: positive for seizures There were no vitals filed for this visit. Objective: I 
 
 
NEUROLOGICAL EXAM: 
 
Appearance: The patient is well developed, well nourished, provides a coherent history and is in no acute distress. Mental Status: Oriented to time, place and person, and the president, cognitive function is normal and speech is fluent and no aphasia or dysarthria. Mood and affect appropriate. Cranial Nerves:   Intact visual fields. Fundi are not testable. BOBO, EOM's full, no nystagmus, no ptosis. Facial sensation is normal to touch. Corneal reflexes are not tested. Facial movement is symmetric. Hearing is normal bilaterally. Palate is midline with normal sternocleidomastoid and trapezius muscles are normal. Tongue is midline. Neck without meningismus Temporal arteries are not tender or enlarged TMJ areas are not tender on palpation Motor:  5/5 strength in upper and lower proximal and distal muscles. Normal bulk and tone. No fasciculations. Rapid alternating movement is symmetric and intact bilaterally Reflexes:   Deep tendon reflexes, Babinski and clonus could not be tested Sensory:   Normal to touch, pinprick and vibration and temperature and double simultaneous stimulation cannot be tested. Gait:  Normal gait for patient's age. Tremor:   No tremor noted. Cerebellar:  No abnormal cerebellar signs present on Romberg and tandem testing and finger-nose-finger exam.  
Neurovascular:  Cardiac examination and carotid examination and peripheral arterial examination could not be tested Assessment: ICD-10-CM ICD-9-CM 1. Complex partial seizure evolving to generalized seizure ([facility] [address]  G40.209 345.40 divalproex DR (DEPAKOTE) 250 mg tablet CBC WITH AUTOMATED DIFF  
   METABOLIC PANEL, COMPREHENSIVE  
   VALPROIC ACID 2. Hypothyroidism due to acquired atrophy of thyroid  E03.4 244.8 divalproex DR (DEPAKOTE) 250 mg tablet 246.8 CBC WITH AUTOMATED DIFF  
   METABOLIC PANEL, COMPREHENSIVE  
   VALPROIC ACID Active Problems: * No active hospital problems. * 
 
 
Plan:  
 
Patient doing fairly well on her current medication without a seizure and 33 years. We reviewed her medication, and did metabolic studies to make sure her liver and hematologic functions are stable on her medication, and her recent lab level was 75 on the Depakote. She is taking 750 mg in the morning and 500 mg at lunch and 750 mg at night and tolerating the medication well without side effects. Her medication was renewed for her today also. She has used the 250 mg Depakote because the larger dose tablets are too hard to swallow for her. She has had no other new medical or neurologic problems. We discussed the benefits of the vaccine and strongly encouraged her to make sure she gets it soon as it is available to her. 30 minutes spent with the patient going over all this with her, and follow-up will be in 1 years time or earlier if need be. Signed By: Noemí Jeffery MD   
 February 5, 2021 Pursuant to the emergency declaration under the [address] [place] [address] [facility] authority and the [employer] Act, this Virtual  Visit was conducted, with patient's consent, to reduce the patient's risk of exposure to COVID-19 and provide continuity of care for an established patient. Services were provided through a video synchronous discussion virtually to substitute for in-person clinic visit. CC: Collin Thomas MD 
FAX: [phone] If you have questions, please do not hesitate to call me. I look forward to following Ms. Townsend along with you. Sincerely, Noemí Jeffery MD

## 2021-02-06 DIAGNOSIS — E03.9 HYPOTHYROIDISM (ACQUIRED): ICD-10-CM

## 2021-02-06 DIAGNOSIS — M85.80 OSTEOPENIA, UNSPECIFIED LOCATION: ICD-10-CM

## 2021-02-06 DIAGNOSIS — I10 ESSENTIAL HYPERTENSION, BENIGN: ICD-10-CM

## 2021-02-06 DIAGNOSIS — E03.4 HYPOTHYROIDISM DUE TO ACQUIRED ATROPHY OF THYROID: ICD-10-CM

## 2021-02-06 DIAGNOSIS — G40.209 COMPLEX PARTIAL SEIZURE EVOLVING TO GENERALIZED SEIZURE (HCC): ICD-10-CM

## 2021-02-06 DIAGNOSIS — R82.994 HYPERCALCIURIA: ICD-10-CM

## 2021-02-07 RX ORDER — CLONAZEPAM 0.5 MG/1
0.5 TABLET ORAL
Qty: 30 TAB | Refills: 3 | Status: SHIPPED | OUTPATIENT
Start: 2021-02-07 | End: 2021-09-26

## 2021-02-16 LAB
25(OH)D3+25(OH)D2 SERPL-MCNC: 35.1 NG/ML (ref 30–100)
ALBUMIN SERPL-MCNC: 4 G/DL (ref 3.8–4.9)
ALBUMIN/GLOB SERPL: 1.4 {RATIO} (ref 1.2–2.2)
ALP SERPL-CCNC: 59 IU/L (ref 39–117)
ALT SERPL-CCNC: 20 IU/L (ref 0–32)
AST SERPL-CCNC: 33 IU/L (ref 0–40)
BASOPHILS # BLD AUTO: 0.1 X10E3/UL (ref 0–0.2)
BASOPHILS NFR BLD AUTO: 1 %
BILIRUB SERPL-MCNC: 0.4 MG/DL (ref 0–1.2)
BUN SERPL-MCNC: 16 MG/DL (ref 6–24)
BUN SERPL-MCNC: 17 MG/DL (ref 6–24)
BUN/CREAT SERPL: 34 (ref 9–23)
BUN/CREAT SERPL: 40 (ref 9–23)
CALCIUM SERPL-MCNC: 10 MG/DL (ref 8.7–10.2)
CALCIUM SERPL-MCNC: 9.7 MG/DL (ref 8.7–10.2)
CHLORIDE SERPL-SCNC: 100 MMOL/L (ref 96–106)
CHLORIDE SERPL-SCNC: 102 MMOL/L (ref 96–106)
CO2 SERPL-SCNC: 21 MMOL/L (ref 20–29)
CO2 SERPL-SCNC: 23 MMOL/L (ref 20–29)
CREAT SERPL-MCNC: 0.43 MG/DL (ref 0.57–1)
CREAT SERPL-MCNC: 0.47 MG/DL (ref 0.57–1)
EOSINOPHIL # BLD AUTO: 0.6 X10E3/UL (ref 0–0.4)
EOSINOPHIL NFR BLD AUTO: 6 %
ERYTHROCYTE [DISTWIDTH] IN BLOOD BY AUTOMATED COUNT: 12.5 % (ref 11.7–15.4)
GLOBULIN SER CALC-MCNC: 2.9 G/DL (ref 1.5–4.5)
GLUCOSE SERPL-MCNC: 106 MG/DL (ref 65–99)
GLUCOSE SERPL-MCNC: 109 MG/DL (ref 65–99)
HCT VFR BLD AUTO: 41.4 % (ref 34–46.6)
HGB BLD-MCNC: 14.4 G/DL (ref 11.1–15.9)
IMM GRANULOCYTES # BLD AUTO: 0.4 X10E3/UL (ref 0–0.1)
IMM GRANULOCYTES NFR BLD AUTO: 4 %
LYMPHOCYTES # BLD AUTO: 3.2 X10E3/UL (ref 0.7–3.1)
LYMPHOCYTES NFR BLD AUTO: 35 %
MCH RBC QN AUTO: 32.2 PG (ref 26.6–33)
MCHC RBC AUTO-ENTMCNC: 34.8 G/DL (ref 31.5–35.7)
MCV RBC AUTO: 93 FL (ref 79–97)
MONOCYTES # BLD AUTO: 0.6 X10E3/UL (ref 0.1–0.9)
MONOCYTES NFR BLD AUTO: 7 %
NEUTROPHILS # BLD AUTO: 4.3 X10E3/UL (ref 1.4–7)
NEUTROPHILS NFR BLD AUTO: 47 %
PLATELET # BLD AUTO: 288 X10E3/UL (ref 150–450)
POTASSIUM SERPL-SCNC: 4.2 MMOL/L (ref 3.5–5.2)
POTASSIUM SERPL-SCNC: 4.3 MMOL/L (ref 3.5–5.2)
PROT SERPL-MCNC: 6.9 G/DL (ref 6–8.5)
RBC # BLD AUTO: 4.47 X10E6/UL (ref 3.77–5.28)
SODIUM SERPL-SCNC: 139 MMOL/L (ref 134–144)
SODIUM SERPL-SCNC: 142 MMOL/L (ref 134–144)
T4 FREE SERPL-MCNC: 1.51 NG/DL (ref 0.82–1.77)
TSH SERPL DL<=0.005 MIU/L-ACNC: 1.82 UIU/ML (ref 0.45–4.5)
VALPROATE SERPL-MCNC: 84 UG/ML (ref 50–100)
WBC # BLD AUTO: 9.2 X10E3/UL (ref 3.4–10.8)

## 2021-02-22 ENCOUNTER — VIRTUAL VISIT (OUTPATIENT)
Dept: ENDOCRINOLOGY | Age: 56
End: 2021-02-22
Payer: COMMERCIAL

## 2021-02-22 DIAGNOSIS — R82.994 HYPERCALCIURIA: ICD-10-CM

## 2021-02-22 DIAGNOSIS — I10 ESSENTIAL HYPERTENSION, BENIGN: ICD-10-CM

## 2021-02-22 DIAGNOSIS — M85.80 OSTEOPENIA, UNSPECIFIED LOCATION: Primary | ICD-10-CM

## 2021-02-22 DIAGNOSIS — E03.9 HYPOTHYROIDISM (ACQUIRED): ICD-10-CM

## 2021-02-22 PROCEDURE — 99214 OFFICE O/P EST MOD 30 MIN: CPT | Performed by: INTERNAL MEDICINE

## 2021-02-22 NOTE — PATIENT INSTRUCTIONS
1) BUN and creatinine are markers of kidney function. Your values are normal. Although your creatinine is low, I'm not concerned about this as I only worry if your value is high. 2) Your calcium and vitamin D are perfect. 3) TSH is a thyroid test.  Your level is 1.8 which is normal and at goal of 0.45-2.0. This test goes opposite of your thyroid dose and suggests your dose of levothyroxine is perfect so I will keep your dose the same. 4) Please come for a follow up visit on 2/21/22 at 9:10am in our [place] office. 5) I put an order directly into the [employer] system to repeat your labs in the 1-2 weeks prior to your next visit so just ask for the order under my name and you will receive a courtesy reminder through [employer] to have these drawn.

## 2021-02-22 NOTE — PROGRESS NOTES
Chief Complaint   Patient presents with    Thyroid Problem     email confirmed as noted    Other     pcp and pharmacy confirmed       **THIS IS A VIRTUAL VISIT VIA A VIDEO SYNCHRONOUS DISCUSSION. PATIENT AGREED TO HAVE THEIR CARE DELIVERED OVER A DOXY. ME VIDEO VISIT IN PLACE OF THEIR REGULARLY SCHEDULED OFFICE VISIT**    History of Present Illness: Olinda Young is a 63 y.o. female here for follow up of thyroid. No major change to health since last visit in 2/20. About a month after her last visit the pain in her right leg did go away and did not have any recurrence. Has been on the higher dose of levothyroxine 112 since 5/20. Has some fatigue that is stable. Some constipation that is unchanged. Still having some hair loss and biotin didn't help and the higher dose hasn't helped. Weight was 245 in 2/20 and doesn't know for sure what it is currently but thinks it's stable. Compliant with vitamin D 1000 units daily and hctz daily. Has not had her BP checked recently. Does feel like it can go up at times. Current Outpatient Medications   Medication Sig    MULTIVITAMIN PO Take  by mouth daily.  clonazePAM (KlonoPIN) 0.5 mg tablet TAKE 1 TAB BY MOUTH EVERY EIGHT (8) HOURS AS NEEDED (SEIZURE).  divalproex DR (DEPAKOTE) 250 mg tablet 3 p.o. every morning, 2 p.o. at lunchtime, and 3 p.o. at nighttime    losartan (COZAAR) 100 mg tablet TAKE 1 TABLET BY MOUTH DAILY. REPLACES LOSARTAN-HCTZ 100/12.5 MG TABS    hydroCHLOROthiazide (HYDRODIURIL) 12.5 mg tablet TAKE 1 TABLET BY MOUTH DAILY. REPLACES LOSARTAN-HCTZ 100/12.5 MG TABS    levothyroxine (SYNTHROID) 112 mcg tablet TAKE 1 TABLET BY MOUTH EVERY DAY--Delete 100 mcg dose from profile    cholecalciferol (VITAMIN D3) 1,000 unit cap Take  by mouth daily.  pravastatin (PRAVACHOL) 20 mg tablet 20 mg. Take 1 tab daily     No current facility-administered medications for this visit.       Allergies   Allergen Reactions    Crestor [Rosuvastatin] Myalgia    Lisinopril Cough    Macrobid [Nitrofurantoin Monohyd/M-Cryst] Other (comments)     headaches     Review of Systems: PER HPI    Physical Examination:  - GENERAL: NCAT, Appears well nourished   - EYES: EOMI, non-icteric, no proptosis   - Ear/Nose/Throat: NCAT, no visible inflammation or masses   - CARDIOVASCULAR: no cyanosis, no visible JVD   - RESPIRATORY: respiratory effort normal without any distress or labored breathing   - MUSCULOSKELETAL: Normal ROM of neck and upper extremities observed   - SKIN: No rash on face  - NEUROLOGIC:  No facial asymmetry (Cranial nerve 7 motor function), No gaze palsy   - PSYCHIATRIC: Normal affect, Normal insight and judgement       Data Reviewed:   Component      Latest Ref Rng & Units 2/15/2021 2/15/2021 2/15/2021 2/15/2021           9:48 AM  9:48 AM  9:48 AM  9:48 AM   Glucose      65 - 99 mg/dL  109 (H)     BUN      6 - 24 mg/dL  17     Creatinine      0.57 - 1.00 mg/dL  0.43 (L)     GFR est non-AA      >59 mL/min/1.73  114     GFR est AA      >59 mL/min/1.73  131     BUN/Creatinine ratio      9 - 23  40 (H)     Sodium      134 - 144 mmol/L  139     Potassium      3.5 - 5.2 mmol/L  4.3     Chloride      96 - 106 mmol/L  100     CO2      20 - 29 mmol/L  21     Calcium      8.7 - 10.2 mg/dL  10.0     T4, Free      0.82 - 1.77 ng/dL    1.51   TSH      0.450 - 4.500 uIU/mL   1.820    VITAMIN D, 25-HYDROXY      30.0 - 100.0 ng/mL 35.1        Assessment/Plan:     1. Osteopenia: She was diagnosed with this based on DXA at Dr. Milad Lemos office in Nov 2012 that showed November 2012 that showed T-score at her lumbar spine was -1.2 which was consistent with osteopenia, -0.4 at the left total hip which was normal and -1.4 at the left femoral neck which was also consistent with osteopenia and started developing stress fractures in her feet.   She has risk factors for low bone density of an almost 30 pack year history of smoking, chronic anti-seizure meds with dilantin and depakote, intermittent courses of prednisone 1-2 times a year for sinus issues and also being in a post-menopausal state. She was started on alendronate 70 mg weekly by PCP and repeat DXA in 1/17 showed:- L-spine: T-score -1.0 (2.3% increase since 2012), left total hip: T-score -0.1 (3% increase since 2012), and left femoral neck: T-score -0.9 (8.6% increase since 2012) so I stopped the alendronate as everything was back to normal at that time. Repeat DXA in 2/19 showed T-score of 0.8 at L-spine (1.9% increase since 1/17), 0.1 at left total hip (3.1% increase since 1/17) and -0.3 at left fem neck (8.9% increase since 1/17). Given everything remains normal, will repeat a scan in 5 years. - repeat DXA in 2/24 at [address]      2. Hypothyroidism (acquired): TSH 2.019 in 6/16 on levothyroxine 75 mcg daily but had gained 8 lbs since then and TSH was 2.79 in 1/17 so increased her dose to 88 mcg daily but had been taking with food and coffee and TSH 2.15 in 5/17 so had her separate from food and coffee but only down to 2.02 in 11/17 so wanted her to change to brand unithroid in case her levels are fluctuating on the generic but she decided not to do this as she felt well and TSH still 1.79 in 5/18 and 1.73 in 2/19 so kept her dose the same. TSH up to 3.57 in 2/20 so increased dose to 100 mcg daily and TSH 2.2 in 5/20 so increased to 112 mcg daily and TSH 1.82 in 2/21 so kept dose the same. - cont levothyroxine 112 mcg daily   - check TSH and free T4 prior to next visit      3. Hypercalciuria: was found to have a 24 hour urine calcium of 495 in 10/16 by Dr. Joao Burton. She decided to stop her calcium supplements at that time. Her vitamin D was normal at 38 in 10/16 and her PTH was normal at 30.   Her repeat was still high at 361 in 1/17 so I added hctz 12.5 mg daily and down to 257 in 4/17 so continued this dose and repeat in 5/18 was still 178 so stayed on this dose and repeat in 5/20 was 181 so won't need to keep following this. Her serum calcium was 10.4 in 5/17 so stopped her vitamin D 1000 units and mvi daily and back to 10.0 in 11/17. D level down to 25 in 5/18 so added back 1000 of D3 daily and level up to 36 in 2/19. However calcium gail from 9.2 in 8/18 to 10.4 in 2/19 after hctz was increased from 12.5 mg to 25 mg daily so decreased her dose back to 12.5 mg daily and calcium 9.6 in 3/19 and 9.7 in 2/20 and 10.0 in 2/21. Vitamin D 35 in 2/20 and 35 in 2/21  - cont losartan 100 mg daily  - cont hctz 12.5 mg daily   - cont vitamin D 1000 units daily  - check bmp and vitamin D 25-OH prior to next visit      4. Hypertension: her BP was at goal < 140/90   - cont current regimen  - monitor home blood pressure readings    Patient Instructions   1) BUN and creatinine are markers of kidney function. Your values are normal. Although your creatinine is low, I'm not concerned about this as I only worry if your value is high. 2) Your calcium and vitamin D are perfect. 3) TSH is a thyroid test.  Your level is 1.8 which is normal and at goal of 0.45-2.0. This test goes opposite of your thyroid dose and suggests your dose of levothyroxine is perfect so I will keep your dose the same. 4) Please come for a follow up visit on 2/21/22 at 9:10am in our [place] office. 5) I put an order directly into the [employer] system to repeat your labs in the 1-2 weeks prior to your next visit so just ask for the order under my name and you will receive a courtesy reminder through [employer] to have these drawn.             Follow-up and Dispositions    · Return 2/21/22 at 9:10am.               Copy sent to:  Dr. Mychal Ignacio via [facility]  Dr. Loreta Sepulveda

## 2021-05-02 RX ORDER — LEVOTHYROXINE SODIUM 112 UG/1
TABLET ORAL
Qty: 90 TAB | Refills: 3 | Status: SHIPPED | OUTPATIENT
Start: 2021-05-02 | End: 2022-02-21

## 2021-09-25 DIAGNOSIS — G40.209 COMPLEX PARTIAL SEIZURE EVOLVING TO GENERALIZED SEIZURE (HCC): ICD-10-CM

## 2021-09-25 DIAGNOSIS — E03.4 HYPOTHYROIDISM DUE TO ACQUIRED ATROPHY OF THYROID: ICD-10-CM

## 2021-09-26 RX ORDER — CLONAZEPAM 0.5 MG/1
0.5 TABLET ORAL
Qty: 30 TABLET | Refills: 5 | Status: SHIPPED | OUTPATIENT
Start: 2021-09-26

## 2021-10-27 RX ORDER — HYDROCHLOROTHIAZIDE 12.5 MG/1
TABLET ORAL
Qty: 90 TABLET | Refills: 3 | Status: SHIPPED | OUTPATIENT
Start: 2021-10-27 | End: 2022-10-07 | Stop reason: SDUPTHER

## 2021-10-27 RX ORDER — LOSARTAN POTASSIUM 100 MG/1
TABLET ORAL
Qty: 90 TABLET | Refills: 3 | Status: SHIPPED | OUTPATIENT
Start: 2021-10-27 | End: 2022-10-29

## 2022-02-07 DIAGNOSIS — R82.994 HYPERCALCIURIA: ICD-10-CM

## 2022-02-07 DIAGNOSIS — I10 ESSENTIAL HYPERTENSION, BENIGN: ICD-10-CM

## 2022-02-07 DIAGNOSIS — E03.9 HYPOTHYROIDISM (ACQUIRED): ICD-10-CM

## 2022-02-07 DIAGNOSIS — M85.80 OSTEOPENIA, UNSPECIFIED LOCATION: ICD-10-CM

## 2022-02-08 DIAGNOSIS — G40.209 COMPLEX PARTIAL SEIZURE EVOLVING TO GENERALIZED SEIZURE (HCC): ICD-10-CM

## 2022-02-08 DIAGNOSIS — E03.4 HYPOTHYROIDISM DUE TO ACQUIRED ATROPHY OF THYROID: ICD-10-CM

## 2022-02-08 RX ORDER — DIVALPROEX SODIUM 250 MG/1
TABLET, DELAYED RELEASE ORAL
Qty: 720 TABLET | Refills: 5 | Status: SHIPPED | OUTPATIENT
Start: 2022-02-08

## 2022-02-11 LAB
25(OH)D3+25(OH)D2 SERPL-MCNC: 44 NG/ML (ref 30–100)
BUN SERPL-MCNC: 13 MG/DL (ref 6–24)
BUN/CREAT SERPL: 23 (ref 9–23)
CALCIUM SERPL-MCNC: 10.4 MG/DL (ref 8.7–10.2)
CHLORIDE SERPL-SCNC: 101 MMOL/L (ref 96–106)
CO2 SERPL-SCNC: 22 MMOL/L (ref 20–29)
CREAT SERPL-MCNC: 0.57 MG/DL (ref 0.57–1)
GLUCOSE SERPL-MCNC: 99 MG/DL (ref 65–99)
POTASSIUM SERPL-SCNC: 4.7 MMOL/L (ref 3.5–5.2)
SODIUM SERPL-SCNC: 142 MMOL/L (ref 134–144)
T4 FREE SERPL-MCNC: 1.6 NG/DL (ref 0.82–1.77)
TSH SERPL DL<=0.005 MIU/L-ACNC: 2.09 UIU/ML (ref 0.45–4.5)

## 2022-02-21 ENCOUNTER — VIRTUAL VISIT (OUTPATIENT)
Dept: ENDOCRINOLOGY | Age: 57
End: 2022-02-21
Payer: COMMERCIAL

## 2022-02-21 DIAGNOSIS — R82.994 HYPERCALCIURIA: ICD-10-CM

## 2022-02-21 DIAGNOSIS — I10 ESSENTIAL HYPERTENSION, BENIGN: ICD-10-CM

## 2022-02-21 DIAGNOSIS — E03.9 HYPOTHYROIDISM (ACQUIRED): ICD-10-CM

## 2022-02-21 DIAGNOSIS — M85.80 OSTEOPENIA, UNSPECIFIED LOCATION: Primary | ICD-10-CM

## 2022-02-21 PROCEDURE — 99214 OFFICE O/P EST MOD 30 MIN: CPT | Performed by: INTERNAL MEDICINE

## 2022-02-21 RX ORDER — LEVOTHYROXINE SODIUM 125 UG/1
TABLET ORAL
Qty: 90 TABLET | Refills: 3 | Status: SHIPPED | OUTPATIENT
Start: 2022-02-21 | End: 2022-04-29

## 2022-02-21 NOTE — PATIENT INSTRUCTIONS
1) TSH is a thyroid test.  Your level is 2.09 which is normal but above goal of 0.45-2.0. This test goes opposite of your thyroid dose and suggests your dose of levothyroxine is not quite enough. I will increase your dose to 125 mcg daily and have sent a new prescription to your local pharmacy. 2) Your calcium is slightly high at 10.4 so please stop your multivitamin. Keep taking the 1000 of D3 daily as your vitamin D level is normal.    3) BUN and creatinine are markers of kidney function. Your values are normal.    4) I put an order directly into the [employer] system to repeat your labs in 2 months and in the 1-2 weeks prior to your next visit so just ask for the order under my name and you will receive a courtesy reminder through [employer] to have these drawn. 5) Please come for a follow up visit on 2/16/23 at 9:30am in our [place] office.

## 2022-02-21 NOTE — PROGRESS NOTES
Chief Complaint   Patient presents with    Thyroid Problem     [email or web address]    Other     pcp and pharmacy confirmed       **THIS IS A VIRTUAL VISIT VIA A VIDEO SYNCHRONOUS DISCUSSION. PATIENT AGREED TO HAVE THEIR CARE DELIVERED OVER A MYCHART VIDEO VISIT IN PLACE OF THEIR REGULARLY SCHEDULED OFFICE VISIT**    History of Present Illness: Amira Badillo is a 61 y.o. female here for follow up of thyroid. No major change to health since last visit in 2/22. Has not checked her BP recently and no headaches or chest pain or dizziness or blurry vision. Still taking 1000 of D3 and a mvi daily. Take the levothyroxine around 1-3 am and doesn't miss her doses aside from on rare occasion and if she does miss will double up. Thinks her weight is about the same as 2 years when it was 245 lbs. Hopes to consider weight watchers to help lose weight. Bowels are regular. Tends to stay hot at times. Does have some dry skin and hair loss. Does have some brittle nails. Does have some fatigue that she has thought was from the depakote. No falls or fractures over the past year but did have a brief period of time about 6-8 weeks ago for a few days where she felt some stress in her left foot but it went away on its own. Previously the issue was on the right foot with the stress fracture. Current Outpatient Medications   Medication Sig    CRANBERRY PO Take  by mouth two (2) times a day.  divalproex DR (DEPAKOTE) 250 mg tablet TAKE 3 TABLETS BY MOUTH IN THE MORNING, 2 TABS AT LUNCH TIME, & 3 TABS AT NIGHT TIME    hydroCHLOROthiazide (HYDRODIURIL) 12.5 mg tablet TAKE 1 TABLET BY MOUTH DAILY. REPLACES LOSARTAN-HCTZ 100/12.5 MG TABS    losartan (COZAAR) 100 mg tablet TAKE 1 TABLET BY MOUTH DAILY. REPLACES LOSARTAN-HCTZ 100/12.5 MG TABS    clonazePAM (KlonoPIN) 0.5 mg tablet TAKE 1 TAB BY MOUTH EVERY EIGHT (8) HOURS AS NEEDED (SEIZURE).     levothyroxine (SYNTHROID) 112 mcg tablet TAKE 1 TABLET BY MOUTH EVERY DAY    MULTIVITAMIN PO Take  by mouth daily.  cholecalciferol (VITAMIN D3) 1,000 unit cap Take  by mouth daily.  pravastatin (PRAVACHOL) 20 mg tablet 20 mg. Take 1 tab daily     No current facility-administered medications for this visit. Allergies   Allergen Reactions    Crestor [Rosuvastatin] Myalgia    Lisinopril Cough    Macrobid [Nitrofurantoin Monohyd/M-Cryst] Other (comments)     headaches     Review of Systems: PER HPI    Physical Examination:  - GENERAL: NCAT, Appears well nourished   - EYES: EOMI, non-icteric, no proptosis   - Ear/Nose/Throat: NCAT, no visible inflammation or masses   - CARDIOVASCULAR: no cyanosis, no visible JVD   - RESPIRATORY: respiratory effort normal without any distress or labored breathing   - MUSCULOSKELETAL: Normal ROM of neck and upper extremities observed   - SKIN: No rash on face  - NEUROLOGIC:  No facial asymmetry (Cranial nerve 7 motor function), No gaze palsy   - PSYCHIATRIC: Normal affect, Normal insight and judgement       Data Reviewed:   Component      Latest Ref Rng & Units 2/10/2022 2/10/2022 2/10/2022 2/10/2022          10:46 AM 10:46 AM 10:46 AM 10:46 AM   Glucose      65 - 99 mg/dL  99     BUN      6 - 24 mg/dL  13     Creatinine      0.57 - 1.00 mg/dL  0.57     GFR est non-AA      >59 mL/min/1.73  103     GFR est AA      >59 mL/min/1.73  119     BUN/Creatinine ratio      9 - 23  23     Sodium      134 - 144 mmol/L  142     Potassium      3.5 - 5.2 mmol/L  4.7     Chloride      96 - 106 mmol/L  101     CO2      20 - 29 mmol/L  22     Calcium      8.7 - 10.2 mg/dL  10.4 (H)     T4, Free      0.82 - 1.77 ng/dL    1.60   TSH      0.450 - 4.500 uIU/mL   2.090    VITAMIN D, 25-HYDROXY      30.0 - 100.0 ng/mL 44.0          Assessment/Plan:     1.  Osteopenia: She was diagnosed with this based on DXA at Dr. Hillary Cain office in Nov 2012 that showed November 2012 that showed T-score at her lumbar spine was -1.2 which was consistent with osteopenia, -0.4 at the left total hip which was normal and -1.4 at the left femoral neck which was also consistent with osteopenia and started developing stress fractures in her feet. She has risk factors for low bone density of an almost 30 pack year history of smoking, chronic anti-seizure meds with dilantin and depakote, intermittent courses of prednisone 1-2 times a year for sinus issues and also being in a post-menopausal state. She was started on alendronate 70 mg weekly by PCP and repeat DXA in 1/17 showed:- L-spine: T-score -1.0 (2.3% increase since 2012), left total hip: T-score -0.1 (3% increase since 2012), and left femoral neck: T-score -0.9 (8.6% increase since 2012) so I stopped the alendronate as everything was back to normal at that time. Repeat DXA in 2/19 showed T-score of 0.8 at L-spine (1.9% increase since 1/17), 0.1 at left total hip (3.1% increase since 1/17) and -0.3 at left fem neck (8.9% increase since 1/17). Given everything remains normal, will repeat a scan in 5 years. - repeat DXA in 2/24 at [facility] [id number]. Hypothyroidism (acquired): TSH 2.019 in 6/16 on levothyroxine 75 mcg daily but had gained 8 lbs since then and TSH was 2.79 in 1/17 so increased her dose to 88 mcg daily but had been taking with food and coffee and TSH 2.15 in 5/17 so had her separate from food and coffee but only down to 2.02 in 11/17 so wanted her to change to brand unithroid in case her levels are fluctuating on the generic but she decided not to do this as she felt well and TSH still 1.79 in 5/18 and 1.73 in 2/19 so kept her dose the same. TSH up to 3.57 in 2/20 so increased dose to 100 mcg daily and TSH 2.2 in 5/20 so increased to 112 mcg daily and TSH 1.82 in 2/21 so kept dose the same. TSH 2.09 in 2/22 so increased to 125 mcg daily  - increase levothyroxine to 125 mcg daily   - check TSH and free T4 in 2 months and prior to next visit        3.  Hypercalciuria: was found to have a 24 hour urine calcium of 495 in 10/16 by  Rita Wilson.  She decided to stop her calcium supplements at that time. Her vitamin D was normal at 38 in 10/16 and her PTH was normal at 30. Her repeat was still high at 361 in 1/17 so I added hctz 12.5 mg daily and down to 257 in 4/17 so continued this dose and repeat in 5/18 was still 178 so stayed on this dose and repeat in 5/20 was 181 so won't need to keep following this. Her serum calcium was 10.4 in 5/17 so stopped her vitamin D 1000 units and mvi daily and back to 10.0 in 11/17. D level down to 25 in 5/18 so added back 1000 of D3 daily and level up to 36 in 2/19. However calcium gail from 9.2 in 8/18 to 10.4 in 2/19 after hctz was increased from 12.5 mg to 25 mg daily so decreased her dose back to 12.5 mg daily and calcium 9.6 in 3/19 and 9.7 in 2/20 and 10.0 in 2/21. Vitamin D 35 in 2/20 and 35 in 2/21. Calcium 10.4 and vitamin D 44 in 2/22 so stopped the mvi. - cont losartan 100 mg daily  - cont hctz 12.5 mg daily   - cont vitamin D 1000 units daily  - stop mvi  - check bmp in 2 months  - check bmp and vitamin D 25-OH prior to next visit        4. Hypertension: her BP was at goal < 140/90 at last in person visit in 2/20 but hasn't checked at home  - cont current regimen  - monitor home blood pressure readings      Patient Instructions   1) TSH is a thyroid test.  Your level is 2.09 which is normal but above goal of 0.45-2.0. This test goes opposite of your thyroid dose and suggests your dose of levothyroxine is not quite enough. I will increase your dose to 125 mcg daily and have sent a new prescription to your local pharmacy. 2) Your calcium is slightly high at 10.4 so please stop your multivitamin. Keep taking the 1000 of D3 daily as your vitamin D level is normal.    3) BUN and creatinine are markers of kidney function.   Your values are normal.    4) I put an order directly into the [employer] system to repeat your labs in 2 months and in the 1-2 weeks prior to your next visit so just ask for the order under my name and you will receive a courtesy reminder through "[employer]" to have these drawn. 5) Please come for a follow up visit on 2/16/23 at 9:30am in our [place] office. Follow-up and Dispositions    · Return 2/16/23 at 9:30am.             Angelina Haywood, was evaluated through a synchronous (real-time) audio-video encounter. The patient (or guardian if applicable) is aware that this is a billable service, which includes applicable co-pays. Verbal consent to proceed has been obtained. The visit was conducted pursuant to the emergency declaration under the [facility], [address] [place] [address] authority and the [employer] General Act. Patient identification was verified, and a caregiver was present when appropriate. The patient was located at home in a state where the provider was licensed to provide care. --Faizan Spears MD on 2/21/2022 at 9:44 AM    An electronic signature was used to authenticate this note. Copy sent to:  Dr. Christie Dotson via [facility]  Dr. Jimenez Barrera      Lab follow up: 04/28/22    Component      Latest Ref Rng & Units 4/26/2022 4/26/2022 4/26/2022          10:20 AM 10:20 AM 10:20 AM   Glucose      65 - 99 mg/dL   114 (H)   BUN      6 - 24 mg/dL   15   Creatinine      0.57 - 1.00 mg/dL   0.53 (L)   eGFR      >59 mL/min/1.73   108   BUN/Creatinine ratio      9 - 23   28 (H)   Sodium      134 - 144 mmol/L   138   Potassium      3.5 - 5.2 mmol/L   4.1   Chloride      96 - 106 mmol/L   100   CO2      20 - 29 mmol/L   21   Calcium      8.7 - 10.2 mg/dL   10.1   TSH      0.450 - 4.500 uIU/mL  2.640    T4, Free      0.82 - 1.77 ng/dL 1.50       Sent her the following message through [employer]:  TSH is a thyroid test.  Your level is 2.64 which is normal but above goal of 0.45-2.0 and up from 2.09 in February.   This test goes opposite of your thyroid dose and suggests your dose of levothyroxine is possibly not enough or you have missed doses in the past 6 weeks or are not taking it properly. Please confirm if you have missed any doses or not in the past 6 weeks. Are you taking on an empty stomach with just water at least 30 min before food and coffee? Are you taking any vitamins within 4 hours of your pill? Are you on any new acid reflux medications or any other new medications? Let me know the answers to these questions and I'll determine if we need to make a dose change or not.    -------------------------------------------------------------------------------------------------------------------  BUN and creatinine are markers of kidney function. Your values are normal.  Although your creatinine is low, I'm not concerned about this as I only worry if your value is high.  -------------------------------------------------------------------------------------------------------------------  Your calcium is back to normal off the multivitamin. Addendum: 04/29/22    We had the following [employer] exchange:    As long as you take the pill 30 min before food or 2 hours after eating you should be fine. Since you have not missed any doses, I will further increase your dose to 137 mcg daily and sent this to your local pharmacy.  -------------------------------------------------------------------------------------------------------------------  I put an order directly into the [employer] system to repeat your labs in 2 months so just ask for the order under my name and you will receive a courtesy reminder through [employer] to have these drawn and I'll be in touch with the results.    ===View-only below this line===      ----- Message -----       From:Rosa Townsend       Sent:4/29/2022  8:18 AM EDT         To:Emerson Luevano MD    Subject:lab results    I have not skipped doses. Maida Bloodgood always wait at least 30 minutes. ..take only with water by itself.    How long do you wait after eating to take it? I have at times had a pack of nabs and waited only a couple of hrs. before taking. ..would that cause a problem? Lab follow up: 07/02/22    Component      Latest Ref Rng & Units 7/1/2022 7/1/2022          11:07 AM 11:07 AM   T4, Free      0.82 - 1.77 ng/dL  1.45   TSH      0.450 - 4.500 uIU/mL 2.180      Sent her the following message through [employer]:  TSH is a thyroid test.  Your level is 2.18 which is normal but still slightly above goal of 0.45-2.0 but improved from 2.64. This test goes opposite of your thyroid dose and suggests your dose of levothyroxine is likely adequate but possibly may not be enough. How do you feel currently? If you feel well, then I recommend we keep your dose the same but if still having some fatigue, then we can try increasing to 150 mcg daily. Let me know when you have a chance. Addendum: 07/04/22    We had the following [employer] exchange:    I went ahead and sent the brand levoxyl at 137 mcg daily (not 150 mcg daily as accidentally written in my last note) to your local [facility] now so you can check the price. If for some reason it's not covered, Please go to [employer] and type in levoxyl (be sure to click on brand not generic levothyroxine) 137 mcg for the quantity of 90 and look to see which pharmacy is closest to you so that you can get this for the cheapest price paying cash. You can either download the rhonda and show the coupon to the pharmacist or you can print off a coupon to take to the pharmacy. Let me know where you want this sent and I'll take care of this.      ===View-only below this line===      ----- Message -----       From:Rosa Townsend       Sent:7/3/2022 10:34 AM EDT         To:Emerson Montana MD    Subject:lab results    would like to try the brand name as i'm having ALOT of hair loss and maybe that would help. I will check with my insurance  co. to see  if they will cover the cost.   I'll let u know.    Thank you.      ----- Message -----       From:Emerson Brock MD       Sent:7/3/2022  9:53 AM EDT         To:Rosa Townsend    Subject:lab results    The other option is changing to brand medication as there is less variability with brand than generic. I'm open to either increasing the levothyroxine dose to 175 or changing to brand levoxyl at the 150 mcg dose but I'm also happy to just leave it the same. You let me know your preference. Take care and Happy 4th of July!        ----- Message -----       From:Rosa Townsend       Sent:7/3/2022  9:06 AM EDT         To:Emerson Brock MD    Subject:lab results    I am always tired. .. don't know if it's the thyroid or depakote (maybe a combination). I'm used to it. I think just leave it as is works for me unless u think I should do otherwise. Thank you.

## 2022-03-19 PROBLEM — R82.994 HYPERCALCIURIA: Status: ACTIVE | Noted: 2017-01-11

## 2022-03-19 PROBLEM — D50.9 IRON DEFICIENCY ANEMIA: Status: ACTIVE | Noted: 2017-01-11

## 2022-03-19 PROBLEM — E66.01 SEVERE OBESITY (HCC): Status: ACTIVE | Noted: 2019-02-18

## 2022-04-19 ENCOUNTER — VIRTUAL VISIT (OUTPATIENT)
Dept: NEUROLOGY | Age: 57
End: 2022-04-19
Payer: COMMERCIAL

## 2022-04-19 DIAGNOSIS — G40.209 COMPLEX PARTIAL SEIZURE EVOLVING TO GENERALIZED SEIZURE (HCC): Primary | ICD-10-CM

## 2022-04-19 DIAGNOSIS — Z51.81 THERAPEUTIC DRUG MONITORING: ICD-10-CM

## 2022-04-19 PROCEDURE — 99213 OFFICE O/P EST LOW 20 MIN: CPT | Performed by: PSYCHIATRY & NEUROLOGY

## 2022-04-19 NOTE — PROGRESS NOTES
Consult    Parag Huerta is a 61 y.o. female who was seen by synchronous (real-time) audio-video technology on 4/19/2022. REFERRED BY:  Antonio Johnson MD    CHIEF COMPLAINT: Seizures      Subjective:     Parag Huerta is a 61 y.o. right-handed  female, seen for follow-up of her seizures, and the patient currently takes 750 mg of Depakote in the morning, 500 at lunch, and 750 mg at night, and had a recent Depakote level done that was 82 last year, she has had no seizures since seen last year, and her last seizure was 1988, and she remained under good control. She was previously on Dilantin went off of that because of osteoporosis, and is doing well with her endocrinologist and that has been corrected. She questions whether she should get the vaccine, and we strongly advised her to get the vaccine as soon as possible when it was her time. 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 She is doing well on Depakote. Routine metabolic studies are drawn today and her Depakote was renewed also for her. Has had no other new medical problems, neurological problems, and continues to do well. We discussed the possible benefits of coming off the medication, but she would prefer to take the medication because she is done so well for so long and does not want to risk having a seizure. She has had no new medical problems or side effects of the medication or any other new neurological problem or new focal neurological symptoms. She has not had any recent blood work done of her liver or white count so we will do that to make sure there are no complications from her Depakote. Her last blood test 1 year ago were okay.     Past Medical History:   Diagnosis Date    Arthritis     Hyperlipidemia     Hypothyroidism (acquired)     Iron deficiency anemia     Osteopenia 5/6/2013    Seizures (HCC)     Unspecified epilepsy without mention of intractable epilepsy       Past Surgical History:   Procedure Laterality Date    HX  SECTION       Family History   Problem Relation Age of Onset    Other Mother         fibromyalgia, sarcoidosis    Thyroid Disease Mother         hypothyroidism    Other Father 27        car accident    Heart Disease Maternal Grandmother     Cancer Maternal Grandmother         kidney    Cancer Maternal Grandfather         colon      Social History     Tobacco Use    Smoking status: Former Smoker     Packs/day: 1.50     Years: 19.00     Pack years: 28.50     Quit date: 2003     Years since quittin.2    Smokeless tobacco: Former User   Substance Use Topics    Alcohol use: No         Current Outpatient Medications:     CRANBERRY PO, Take  by mouth two (2) times a day., Disp: , Rfl:     levothyroxine (SYNTHROID) 125 mcg tablet, TAKE 1 TABLET BY MOUTH EVERY DAY--Delete 112 mcg dose from profile, Disp: 90 Tablet, Rfl: 3    divalproex DR (DEPAKOTE) 250 mg tablet, TAKE 3 TABLETS BY MOUTH IN THE MORNING, 2 TABS AT LUNCH TIME, & 3 TABS AT NIGHT TIME, Disp: 720 Tablet, Rfl: 5    hydroCHLOROthiazide (HYDRODIURIL) 12.5 mg tablet, TAKE 1 TABLET BY MOUTH DAILY. REPLACES LOSARTAN-HCTZ 100/12.5 MG TABS, Disp: 90 Tablet, Rfl: 3    losartan (COZAAR) 100 mg tablet, TAKE 1 TABLET BY MOUTH DAILY. REPLACES LOSARTAN-HCTZ 100/12.5 MG TABS, Disp: 90 Tablet, Rfl: 3    clonazePAM (KlonoPIN) 0.5 mg tablet, TAKE 1 TAB BY MOUTH EVERY EIGHT (8) HOURS AS NEEDED (SEIZURE). , Disp: 30 Tablet, Rfl: 5    cholecalciferol (VITAMIN D3) 1,000 unit cap, Take  by mouth daily. , Disp: , Rfl:     pravastatin (PRAVACHOL) 20 mg tablet, 20 mg.  Take 1 tab daily, Disp: , Rfl:         Allergies   Allergen Reactions    Crestor [Rosuvastatin] Myalgia    Lisinopril Cough    Macrobid [Nitrofurantoin Monohyd/M-Cryst] Other (comments)     headaches      MRI Results (most recent):  Results from Hospital Encounter encounter on 12    MRI FOOT LT WO CONT    Narrative  **Final Report**      ICD Codes / Adm. Diagnosis: 729.5  845.10 / Pain in limb  Sprain of foot,  unspecified si  Examination:   FOOT WO CON LT  - [id number] - Dec 17 2012  1:07PM  Accession No:  [id number]  Reason:  FOOT SPRAIN, FOOT REGION: Whole      REPORT:  INDICATION: Left foot pain, sprain    COMPARISON: None    EXAM: Sagittal T1-weighted spin-echo and fat-suppressed T2-weighted fast  spin-echo, sagittal inversion recovery, and short axis and long axis  TI-weighted spin-echo and fat-suppressed T2-weighted fast spin-echo MR  images of the left forefoot are obtained. FINDINGS: There is a moderately prominent subcutaneous edema dorsal to the  third and fourth metatarsal bones. There is diffusely abnormal signal of  the third and fourth metatarsal distal diaphyses, necks and heads, with  parosteal edema-like signal most prominently demonstrated laterally at the  neck of the third metatarsal bone. There is a small effusion of the third  metatarsophalangeal joint. No tendon derangement is evident nor is there  bone or soft tissue mass. IMPRESSION: Edema like signal of the third and fourth metatarsal shafts  through heads. These findings could represent fractures or other causes for  osseous edema including osteomyelitis. Clinical correlation recommended. Signing/Reading Doctor: Ulysses Nieto ([id number])  Approved: MAURICIO Nieto ([id number])  Dec 17 2012  1:18PM      Results from Hospital Encounter encounter on 12/17/12    MRI FOOT LT WO CONT    Narrative  **Final Report**      ICD Codes / Adm. Diagnosis: 729.5  845.10 / Pain in limb  Sprain of foot,  unspecified si  Examination:   FOOT WO [facility] LT  - [id number] - Dec 17 2012  1:07PM  Accession No:  [id number]  Reason:  FOOT SPRAIN, FOOT REGION: Whole      REPORT:  INDICATION: Left foot pain, sprain    COMPARISON: None    EXAM: Sagittal T1-weighted spin-echo and fat-suppressed T2-weighted fast  spin-echo, sagittal inversion recovery, and short axis and long axis  TI-weighted spin-echo and fat-suppressed T2-weighted fast spin-echo MR  images of the left forefoot are obtained. FINDINGS: There is a moderately prominent subcutaneous edema dorsal to the  third and fourth metatarsal bones. There is diffusely abnormal signal of  the third and fourth metatarsal distal diaphyses, necks and heads, with  parosteal edema-like signal most prominently demonstrated laterally at the  neck of the third metatarsal bone. There is a small effusion of the third  metatarsophalangeal joint. No tendon derangement is evident nor is there  bone or soft tissue mass. IMPRESSION: Edema like signal of the third and fourth metatarsal shafts  through heads. These findings could represent fractures or other causes for  osseous edema including osteomyelitis. Clinical correlation recommended. Signing/Reading Doctor: Edison Weber. Malachi Bangura ([id number])  Approved: MAURICIO Bangura ([id number])  Dec 17 2012  1:18PM    Review of Systems:  A comprehensive review of systems was negative except for: Neurological: positive for seizures   There were no vitals filed for this visit. Objective:     I      NEUROLOGICAL EXAM:    Appearance: The patient is well developed, well nourished, provides a coherent history and is in no acute distress. Mental Status: Oriented to time, place and person, and the president, cognitive function is normal and speech is fluent and no aphasia or dysarthria. Mood and affect appropriate. Cranial Nerves:   Intact visual fields. Fundi are not testable. BOBO, EOM's full, no nystagmus, no ptosis. Facial sensation is normal to touch. Corneal reflexes are not tested. Facial movement is symmetric. Hearing is normal bilaterally. Palate is midline with normal sternocleidomastoid and trapezius muscles are normal. Tongue is midline.   Neck without meningismus   Temporal arteries are not tender or enlarged  TMJ areas are not tender on palpation   Motor:  5/5 strength in upper and lower proximal and distal muscles. Normal bulk and tone. No fasciculations. Rapid alternating movement is symmetric and intact bilaterally   Reflexes:   Deep tendon reflexes, Babinski and clonus could not be tested   Sensory:   Normal to touch, pinprick and vibration and temperature and double simultaneous stimulation cannot be tested. Gait:  Normal gait for patient's age. Tremor:   No tremor noted. Cerebellar:  No abnormal cerebellar signs present on Romberg and tandem testing and finger-nose-finger exam.   Neurovascular:  Cardiac examination and carotid examination and peripheral arterial examination could not be tested           Assessment:       ICD-10-CM ICD-9-CM    1. Complex partial seizure evolving to generalized seizure ([facility] [address]  G40.209 345.40 CBC WITH AUTOMATED DIFF      METABOLIC PANEL, COMPREHENSIVE      VALPROIC ACID   2. Therapeutic drug monitoring  Z51.81 V58.83 CBC WITH AUTOMATED DIFF      METABOLIC PANEL, COMPREHENSIVE      VALPROIC ACID     Active Problems:    * No active hospital problems. *      Plan:     Patient doing fairly well on her current medication without a seizure and since 1988  We reviewed her medication, and did metabolic studies to make sure her liver and hematologic functions are stable on her medication, and her recent lab level was 82 on the Depakote 1 year ago  She is taking 750 mg in the morning and 500 mg at lunch and 750 mg at night and tolerating the medication well without side effects. Her medication was renewed for her also. She has used the 250 mg Depakote because the larger dose tablets are too hard to swallow for her. She has had no other new medical or neurologic problems. 25 minutes spent with the patient going over all this with her, and follow-up will be in 1 years time or earlier if need be.     Signed By: Giuliana Vargas MD     April 19, 2022       Pursuant to the emergency declaration under the [address] Emergencies Act, 1135 waiver authority and the Coronavirus Preparedness and Response Supplemental Appropriations Act, this Virtual  Visit was conducted, with patient's consent, to reduce the patient's risk of exposure to COVID-19 and provide continuity of care for an established patient. Services were provided through a video synchronous discussion virtually to substitute for in-person clinic visit.         CC: Tristin Wren MD  FAX: [phone]

## 2022-04-19 NOTE — LETTER
4/19/2022 9:08 AM    Patient:  Ingrid Seen   YOB: 1965  Date of Visit: 4/19/2022      Dear   No Recipients: Thank you for referring Ms. Mackenzie Fletcher to me for evaluation/treatment. Below are the relevant portions of my assessment and plan of care. Consult    Ingrid Seen is a 61 y.o. female who was seen by synchronous (real-time) audio-video technology on 4/19/2022. REFERRED BY:  Kaci Rangel MD    CHIEF COMPLAINT: Seizures      Subjective:     Ingrid Seen is a 61 y.o. right-handed  female, seen for follow-up of her seizures, and the patient currently takes 750 mg of Depakote in the morning, 500 at lunch, and 750 mg at night, and had a recent Depakote level done that was 82 last year, she has had no seizures since seen last year, and her last seizure was 1988, and she remained under good control. She was previously on Dilantin went off of that because of osteoporosis, and is doing well with her endocrinologist and that has been corrected. She questions whether she should get the vaccine, and we strongly advised her to get the vaccine as soon as possible when it was her time. 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 She is doing well on Depakote. Routine metabolic studies are drawn today and her Depakote was renewed also for her. Has had no other new medical problems, neurological problems, and continues to do well. We discussed the possible benefits of coming off the medication, but she would prefer to take the medication because she is done so well for so long and does not want to risk having a seizure. She has had no new medical problems or side effects of the medication or any other new neurological problem or new focal neurological symptoms.   She has not had any recent blood work done of her liver or white count so we will do that to make sure there are no complications from her Depakote. Her last blood test 1 year ago were okay. Past Medical History:   Diagnosis Date    Arthritis     Hyperlipidemia     Hypothyroidism (acquired)     Iron deficiency anemia     Osteopenia 2013    Seizures (HCC)     Unspecified epilepsy without mention of intractable epilepsy       Past Surgical History:   Procedure Laterality Date    HX  SECTION       Family History   Problem Relation Age of Onset    Other Mother         fibromyalgia, sarcoidosis    Thyroid Disease Mother         hypothyroidism    Other Father 27        car accident    Heart Disease Maternal Grandmother     Cancer Maternal Grandmother         kidney    Cancer Maternal Grandfather         colon      Social History     Tobacco Use    Smoking status: Former Smoker     Packs/day: 1.50     Years: 19.00     Pack years: 28.50     Quit date: 2003     Years since quittin.2    Smokeless tobacco: Former User   Substance Use Topics    Alcohol use: No         Current Outpatient Medications:     CRANBERRY PO, Take  by mouth two (2) times a day., Disp: , Rfl:     levothyroxine (SYNTHROID) 125 mcg tablet, TAKE 1 TABLET BY MOUTH EVERY DAY--Delete 112 mcg dose from profile, Disp: 90 Tablet, Rfl: 3    divalproex DR (DEPAKOTE) 250 mg tablet, TAKE 3 TABLETS BY MOUTH IN THE MORNING, 2 TABS AT LUNCH TIME, & 3 TABS AT NIGHT TIME, Disp: 720 Tablet, Rfl: 5    hydroCHLOROthiazide (HYDRODIURIL) 12.5 mg tablet, TAKE 1 TABLET BY MOUTH DAILY. REPLACES LOSARTAN-HCTZ 100/12.5 MG TABS, Disp: 90 Tablet, Rfl: 3    losartan (COZAAR) 100 mg tablet, TAKE 1 TABLET BY MOUTH DAILY. REPLACES LOSARTAN-HCTZ 100/12.5 MG TABS, Disp: 90 Tablet, Rfl: 3    clonazePAM (KlonoPIN) 0.5 mg tablet, TAKE 1 TAB BY MOUTH EVERY EIGHT (8) HOURS AS NEEDED (SEIZURE). , Disp: 30 Tablet, Rfl: 5    cholecalciferol (VITAMIN D3) 1,000 unit cap, Take  by mouth daily. , Disp: , Rfl:     pravastatin (PRAVACHOL) 20 mg tablet, 20 mg.  Take 1 tab daily, Disp: , Rfl: Allergies   Allergen Reactions    Crestor [Rosuvastatin] Myalgia    Lisinopril Cough    Macrobid [Nitrofurantoin Monohyd/M-Cryst] Other (comments)     headaches      MRI Results (most recent):  Results from Hospital Encounter encounter on 12/17/12    MRI FOOT LT WO CONT    Narrative  **Final Report**      ICD Codes / Adm. Diagnosis: 729.5  845.10 / Pain in limb  Sprain of foot,  unspecified si  Examination:  MR FOOT WO CON LT  - [id number] - Dec 17 2012  1:07PM  Accession No:  [id number]  Reason:  FOOT SPRAIN, FOOT REGION: Whole      REPORT:  INDICATION: Left foot pain, sprain    COMPARISON: None    EXAM: Sagittal T1-weighted spin-echo and fat-suppressed T2-weighted fast  spin-echo, sagittal inversion recovery, and short axis and long axis  TI-weighted spin-echo and fat-suppressed T2-weighted fast spin-echo MR  images of the left forefoot are obtained. FINDINGS: There is a moderately prominent subcutaneous edema dorsal to the  third and fourth metatarsal bones. There is diffusely abnormal signal of  the third and fourth metatarsal distal diaphyses, necks and heads, with  parosteal edema-like signal most prominently demonstrated laterally at the  neck of the third metatarsal bone. There is a small effusion of the third  metatarsophalangeal joint. No tendon derangement is evident nor is there  bone or soft tissue mass. IMPRESSION: Edema like signal of the third and fourth metatarsal shafts  through heads. These findings could represent fractures or other causes for  osseous edema including osteomyelitis. Clinical correlation recommended. Signing/Reading Doctor: Conner Ferrara ([id number])  Approved: MAURICIO Ferrara ([id number])  Dec 17 2012  1:18PM      Results from Hospital Encounter encounter on 12/17/12    MRI FOOT LT WO CONT    Narrative  **Final Report**      ICD Codes / Adm. Diagnosis: 729.5  845.10 / Pain in limb  Sprain of foot,  unspecified si  Examination:  MR FOOT WO CON LT  - [id number] - Dec 17 2012  1:07PM  Accession No:  [id number]  Reason:  FOOT SPRAIN, FOOT REGION: Whole      REPORT:  INDICATION: Left foot pain, sprain    COMPARISON: None    EXAM: Sagittal T1-weighted spin-echo and fat-suppressed T2-weighted fast  spin-echo, sagittal inversion recovery, and short axis and long axis  TI-weighted spin-echo and fat-suppressed T2-weighted fast spin-echo MR  images of the left forefoot are obtained. FINDINGS: There is a moderately prominent subcutaneous edema dorsal to the  third and fourth metatarsal bones. There is diffusely abnormal signal of  the third and fourth metatarsal distal diaphyses, necks and heads, with  parosteal edema-like signal most prominently demonstrated laterally at the  neck of the third metatarsal bone. There is a small effusion of the third  metatarsophalangeal joint. No tendon derangement is evident nor is there  bone or soft tissue mass. IMPRESSION: Edema like signal of the third and fourth metatarsal shafts  through heads. These findings could represent fractures or other causes for  osseous edema including osteomyelitis. Clinical correlation recommended. Signing/Reading Doctor: Leanna Arredondo ([id number])  Approved: MAURICIO Arredondo ([id number])  Dec 17 2012  1:18PM    Review of Systems:  A comprehensive review of systems was negative except for: Neurological: positive for seizures   There were no vitals filed for this visit. Objective:     I      NEUROLOGICAL EXAM:    Appearance: The patient is well developed, well nourished, provides a coherent history and is in no acute distress. Mental Status: Oriented to time, place and person, and the president, cognitive function is normal and speech is fluent and no aphasia or dysarthria. Mood and affect appropriate. Cranial Nerves:   Intact visual fields. Fundi are not testable. BOBO, EOM's full, no nystagmus, no ptosis. Facial sensation is normal to touch. Corneal reflexes are not tested.  Facial movement is symmetric. Hearing is normal bilaterally. Palate is midline with normal sternocleidomastoid and trapezius muscles are normal. Tongue is midline. Neck without meningismus   Temporal arteries are not tender or enlarged  TMJ areas are not tender on palpation   Motor:  5/5 strength in upper and lower proximal and distal muscles. Normal bulk and tone. No fasciculations. Rapid alternating movement is symmetric and intact bilaterally   Reflexes:   Deep tendon reflexes, Babinski and clonus could not be tested   Sensory:   Normal to touch, pinprick and vibration and temperature and double simultaneous stimulation cannot be tested. Gait:  Normal gait for patient's age. Tremor:   No tremor noted. Cerebellar:  No abnormal cerebellar signs present on Romberg and tandem testing and finger-nose-finger exam.   Neurovascular:  Cardiac examination and carotid examination and peripheral arterial examination could not be tested           Assessment:       ICD-10-CM ICD-9-CM    1. 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 Therapeutic drug monitoring  Z51.81 V58.83 CBC WITH AUTOMATED DIFF      METABOLIC PANEL, COMPREHENSIVE      VALPROIC ACID     Active Problems:    * No active hospital problems. *      Plan:     Patient doing fairly well on her current medication without a seizure and since 1988  We reviewed her medication, and did metabolic studies to make sure her liver and hematologic functions are stable on her medication, and her recent lab level was 82 on the Depakote 1 year ago  She is taking 750 mg in the morning and 500 mg at lunch and 750 mg at night and tolerating the medication well without side effects. Her medication was renewed for her also. She has used the 250 mg Depakote because the larger dose tablets are too hard to swallow for her.   She has had no other new medical or neurologic problems. 25 minutes spent with the patient going over all this with her, and follow-up will be in 1 years time or earlier if need be. Signed By: Terrie Hendricks MD     April 19, 2022       Pursuant to the emergency declaration under the [address] [place] [address], [facility] waiver authority and the [employer] Act, this Virtual  Visit was conducted, with patient's consent, to reduce the patient's risk of exposure to COVID-19 and provide continuity of care for an established patient. Services were provided through a video synchronous discussion virtually to substitute for in-person clinic visit. CC: Steven Lynn MD  FAX: [phone]        If you have questions, please do not hesitate to call me. I look forward to following Ms. Townsend along with you.         Sincerely,      Terrie Hendricks MD

## 2022-04-21 DIAGNOSIS — I10 ESSENTIAL HYPERTENSION, BENIGN: ICD-10-CM

## 2022-04-21 DIAGNOSIS — E03.9 HYPOTHYROIDISM (ACQUIRED): ICD-10-CM

## 2022-04-21 DIAGNOSIS — R82.994 HYPERCALCIURIA: ICD-10-CM

## 2022-04-21 DIAGNOSIS — M85.80 OSTEOPENIA, UNSPECIFIED LOCATION: ICD-10-CM

## 2022-04-27 LAB
ALBUMIN SERPL-MCNC: 4.1 G/DL (ref 3.8–4.9)
ALBUMIN/GLOB SERPL: 1.3 {RATIO} (ref 1.2–2.2)
ALP SERPL-CCNC: 61 IU/L (ref 44–121)
ALT SERPL-CCNC: 14 IU/L (ref 0–32)
AST SERPL-CCNC: 32 IU/L (ref 0–40)
BASOPHILS # BLD AUTO: 0.1 X10E3/UL (ref 0–0.2)
BASOPHILS NFR BLD AUTO: 1 %
BILIRUB SERPL-MCNC: 0.4 MG/DL (ref 0–1.2)
BUN SERPL-MCNC: 15 MG/DL (ref 6–24)
BUN SERPL-MCNC: 15 MG/DL (ref 6–24)
BUN/CREAT SERPL: 27 (ref 9–23)
BUN/CREAT SERPL: 28 (ref 9–23)
CALCIUM SERPL-MCNC: 10 MG/DL (ref 8.7–10.2)
CALCIUM SERPL-MCNC: 10.1 MG/DL (ref 8.7–10.2)
CHLORIDE SERPL-SCNC: 100 MMOL/L (ref 96–106)
CHLORIDE SERPL-SCNC: 100 MMOL/L (ref 96–106)
CO2 SERPL-SCNC: 21 MMOL/L (ref 20–29)
CO2 SERPL-SCNC: 23 MMOL/L (ref 20–29)
CREAT SERPL-MCNC: 0.53 MG/DL (ref 0.57–1)
CREAT SERPL-MCNC: 0.56 MG/DL (ref 0.57–1)
EGFR: 106 ML/MIN/1.73
EGFR: 108 ML/MIN/1.73
EOSINOPHIL # BLD AUTO: 0.5 X10E3/UL (ref 0–0.4)
EOSINOPHIL NFR BLD AUTO: 4 %
ERYTHROCYTE [DISTWIDTH] IN BLOOD BY AUTOMATED COUNT: 12.7 % (ref 11.7–15.4)
GLOBULIN SER CALC-MCNC: 3.1 G/DL (ref 1.5–4.5)
GLUCOSE SERPL-MCNC: 113 MG/DL (ref 65–99)
GLUCOSE SERPL-MCNC: 114 MG/DL (ref 65–99)
HCT VFR BLD AUTO: 41.4 % (ref 34–46.6)
HGB BLD-MCNC: 14.1 G/DL (ref 11.1–15.9)
IMM GRANULOCYTES # BLD AUTO: 0.2 X10E3/UL (ref 0–0.1)
IMM GRANULOCYTES NFR BLD AUTO: 2 %
LYMPHOCYTES # BLD AUTO: 4 X10E3/UL (ref 0.7–3.1)
LYMPHOCYTES NFR BLD AUTO: 36 %
MCH RBC QN AUTO: 32 PG (ref 26.6–33)
MCHC RBC AUTO-ENTMCNC: 34.1 G/DL (ref 31.5–35.7)
MCV RBC AUTO: 94 FL (ref 79–97)
MONOCYTES # BLD AUTO: 0.8 X10E3/UL (ref 0.1–0.9)
MONOCYTES NFR BLD AUTO: 7 %
NEUTROPHILS # BLD AUTO: 5.6 X10E3/UL (ref 1.4–7)
NEUTROPHILS NFR BLD AUTO: 50 %
PLATELET # BLD AUTO: 285 X10E3/UL (ref 150–450)
POTASSIUM SERPL-SCNC: 4.1 MMOL/L (ref 3.5–5.2)
POTASSIUM SERPL-SCNC: 4.2 MMOL/L (ref 3.5–5.2)
PROT SERPL-MCNC: 7.2 G/DL (ref 6–8.5)
RBC # BLD AUTO: 4.41 X10E6/UL (ref 3.77–5.28)
SODIUM SERPL-SCNC: 138 MMOL/L (ref 134–144)
SODIUM SERPL-SCNC: 140 MMOL/L (ref 134–144)
T4 FREE SERPL-MCNC: 1.5 NG/DL (ref 0.82–1.77)
TSH SERPL DL<=0.005 MIU/L-ACNC: 2.64 UIU/ML (ref 0.45–4.5)
VALPROATE SERPL-MCNC: 82 UG/ML (ref 50–100)
WBC # BLD AUTO: 11.1 X10E3/UL (ref 3.4–10.8)

## 2022-04-29 RX ORDER — LEVOTHYROXINE SODIUM 137 UG/1
TABLET ORAL
Qty: 90 TABLET | Refills: 3 | Status: SHIPPED | OUTPATIENT
Start: 2022-04-29 | End: 2022-07-04 | Stop reason: ALTCHOICE

## 2022-06-29 DIAGNOSIS — E03.9 HYPOTHYROIDISM (ACQUIRED): ICD-10-CM

## 2022-07-02 LAB
T4 FREE SERPL-MCNC: 1.45 NG/DL (ref 0.82–1.77)
TSH SERPL DL<=0.005 MIU/L-ACNC: 2.18 UIU/ML (ref 0.45–4.5)

## 2022-07-04 RX ORDER — LEVOTHYROXINE SODIUM 137 UG/1
137 TABLET ORAL
Qty: 90 TABLET | Refills: 3 | Status: SHIPPED | OUTPATIENT
Start: 2022-07-04

## 2022-08-11 DIAGNOSIS — E78.5 HYPERLIPIDEMIA LDL GOAL <100: ICD-10-CM

## 2022-08-11 DIAGNOSIS — E03.9 HYPOTHYROIDISM (ACQUIRED): Primary | ICD-10-CM

## 2022-08-11 DIAGNOSIS — I10 ESSENTIAL HYPERTENSION, BENIGN: ICD-10-CM

## 2022-09-19 RX ORDER — PRAVASTATIN SODIUM 40 MG/1
40 TABLET ORAL
Qty: 90 TABLET | Refills: 3 | Status: SHIPPED | OUTPATIENT
Start: 2022-09-19

## 2022-09-19 NOTE — TELEPHONE ENCOUNTER
9/19/2022    Pt called and left a vm at 10:57 am stating her medication Pravastatin 40mg is due to be refilled. She only have a week worth of medication left and would like a call back at [phone].     Thanks,   Tori Anderson

## 2022-09-20 NOTE — TELEPHONE ENCOUNTER
We had the following [email or web address] exchange: This was taken care of.     ===View-only below this line===      ----- Message -----       From:Rosa Townsend       Sent:9/19/2022 11:13 AM EDT         To:Emerson Rosen MD    Subject:pravastatin 40mg. Dr. Rosalinda Larson,  you told me to let you know when I needed a refill. .. I am now down to about a wks. worth of medication. If you would phone it in for me to my regular pharmacy. .. [facility]  #[phone] . Thank you so much. . I hope ur doing well.

## 2022-10-07 RX ORDER — HYDROCHLOROTHIAZIDE 12.5 MG/1
TABLET ORAL
Qty: 90 TABLET | Refills: 3 | Status: SHIPPED | OUTPATIENT
Start: 2022-10-07

## 2022-10-19 DIAGNOSIS — Z51.81 THERAPEUTIC DRUG MONITORING: ICD-10-CM

## 2022-10-19 DIAGNOSIS — G40.209 COMPLEX PARTIAL SEIZURE EVOLVING TO GENERALIZED SEIZURE (HCC): ICD-10-CM

## 2022-10-29 RX ORDER — LOSARTAN POTASSIUM 100 MG/1
TABLET ORAL
Qty: 90 TABLET | Refills: 3 | Status: SHIPPED | OUTPATIENT
Start: 2022-10-29

## 2023-01-03 RX ORDER — PRAVASTATIN SODIUM 40 MG/1
40 TABLET ORAL
Qty: 90 TABLET | Refills: 3 | Status: SHIPPED | OUTPATIENT
Start: 2023-01-03

## 2023-01-03 RX ORDER — HYDROCHLOROTHIAZIDE 12.5 MG/1
TABLET ORAL
Qty: 90 TABLET | Refills: 3 | Status: SHIPPED | OUTPATIENT
Start: 2023-01-03

## 2023-01-03 RX ORDER — LOSARTAN POTASSIUM 100 MG/1
TABLET ORAL
Qty: 90 TABLET | Refills: 3 | Status: SHIPPED | OUTPATIENT
Start: 2023-01-03

## 2023-01-03 RX ORDER — LEVOTHYROXINE SODIUM 137 UG/1
137 TABLET ORAL
Qty: 90 TABLET | Refills: 3 | Status: SHIPPED | OUTPATIENT
Start: 2023-01-03

## 2023-01-04 DIAGNOSIS — G40.209 COMPLEX PARTIAL SEIZURE EVOLVING TO GENERALIZED SEIZURE (HCC): ICD-10-CM

## 2023-01-04 DIAGNOSIS — E03.4 HYPOTHYROIDISM DUE TO ACQUIRED ATROPHY OF THYROID: ICD-10-CM

## 2023-01-04 RX ORDER — DIVALPROEX SODIUM 250 MG/1
TABLET, DELAYED RELEASE ORAL
Qty: 720 TABLET | Refills: 5 | Status: SHIPPED | OUTPATIENT
Start: 2023-01-04

## 2023-01-09 RX ORDER — LEVOTHYROXINE SODIUM 137 UG/1
TABLET ORAL
Qty: 7 TABLET | Refills: 0 | Status: SHIPPED | OUTPATIENT
Start: 2023-01-09

## 2023-01-10 ENCOUNTER — TELEPHONE (OUTPATIENT)
Dept: NEUROLOGY | Age: 58
End: 2023-01-10

## 2023-01-10 ENCOUNTER — DOCUMENTATION ONLY (OUTPATIENT)
Dept: NEUROLOGY | Age: 58
End: 2023-01-10

## 2023-01-10 NOTE — PROGRESS NOTES
Faxed signed Dival[facility]ex Sod [facility] at [phone].  Received confirmation that fax went through successfully

## 2023-01-12 NOTE — TELEPHONE ENCOUNTER
Spoke with patient. Verified patient with two patient identifiers. States she has gotten her Depakote from the pharmacy, no longer needs us. Patient verbalized understanding.

## 2023-02-02 DIAGNOSIS — E78.5 HYPERLIPIDEMIA LDL GOAL <100: ICD-10-CM

## 2023-02-02 DIAGNOSIS — I10 ESSENTIAL HYPERTENSION, BENIGN: ICD-10-CM

## 2023-02-02 DIAGNOSIS — E03.9 HYPOTHYROIDISM (ACQUIRED): ICD-10-CM

## 2023-02-16 ENCOUNTER — VIRTUAL VISIT (OUTPATIENT)
Dept: ENDOCRINOLOGY | Age: 58
End: 2023-02-16
Payer: COMMERCIAL

## 2023-02-16 DIAGNOSIS — R73.01 IMPAIRED FASTING GLUCOSE: ICD-10-CM

## 2023-02-16 DIAGNOSIS — M85.80 OSTEOPENIA, UNSPECIFIED LOCATION: Primary | ICD-10-CM

## 2023-02-16 DIAGNOSIS — I10 ESSENTIAL HYPERTENSION, BENIGN: ICD-10-CM

## 2023-02-16 DIAGNOSIS — E78.5 HYPERLIPIDEMIA LDL GOAL <100: ICD-10-CM

## 2023-02-16 DIAGNOSIS — R82.994 HYPERCALCIURIA: ICD-10-CM

## 2023-02-16 DIAGNOSIS — E03.9 HYPOTHYROIDISM (ACQUIRED): ICD-10-CM

## 2023-02-16 PROCEDURE — 99214 OFFICE O/P EST MOD 30 MIN: CPT | Performed by: INTERNAL MEDICINE

## 2023-02-16 NOTE — PROGRESS NOTES
Chief Complaint   Patient presents with    Thyroid Problem     PCP and Pharmacy verified    Other     Send link to pt's email: Master@"[email or web address]       **THIS IS A VIRTUAL VISIT VIA A VIDEO SYNCHRONOUS DISCUSSION. PATIENT AGREED TO HAVE THEIR CARE DELIVERED OVER A [x+1]HART VIDEO VISIT IN PLACE OF THEIR REGULARLY SCHEDULED OFFICE VISIT**    History of Present Illness: Manisha Brown is a 61 y.o. female here for follow up of thyroid. She has had a cough and cold the past few weeks so she asked to do virtual today. We changed to levoxyl in the summer and hasn't noticed any change in her energy on the brand vs generic. Did keto diet and was eating more red meat and conrad and thinks she lost about 20-25 lbs before [place] as her clothes felt looser and was moving around easier but didn't actually weigh herself but then stopped this and weight has gone back up. Compliant with pravastatin 40 mg daily. Was fasting on her lab draw. Doesn't think she has a FH of DM. Has been off the mvi since last year. Current Outpatient Medications   Medication Sig    LevoxyL 137 mcg tablet Take 1 Tablet by mouth Daily (before breakfast). BRAND MEDICALLY NECESSARY    divalproex DR (DEPAKOTE) 250 mg tablet TAKE 3 TABLETS BY MOUTH IN THE MORNING, 2 TABS AT LUNCH TIME, & 3 TABS AT NIGHT TIME  Indications: convulsive seizures    LevoxyL 137 mcg tablet Take 1 Tablet by mouth Daily (before breakfast). BRAND MEDICALLY NECESSARY    losartan (COZAAR) 100 mg tablet TAKE 1 TABLET BY MOUTH DAILY. REPLACES LOSARTAN-HCTZ 100/12.5 MG TABS    pravastatin (PRAVACHOL) 40 mg tablet Take 1 Tablet by mouth nightly. hydroCHLOROthiazide (HYDRODIURIL) 12.5 mg tablet TAKE 1 TABLET BY MOUTH DAILY. REPLACES LOSARTAN-HCTZ 100/12.5 MG TABS    CRANBERRY PO Take  by mouth two (2) times a day. clonazePAM (KlonoPIN) 0.5 mg tablet TAKE 1 TAB BY MOUTH EVERY EIGHT (8) HOURS AS NEEDED (SEIZURE).     cholecalciferol (VITAMIN D3) 25 mcg (1,000 unit) cap Take  by mouth daily. No current facility-administered medications for this visit. Allergies   Allergen Reactions    Crestor [Rosuvastatin] Myalgia    Lisinopril Cough    Macrobid [Nitrofurantoin Monohyd/M-Cryst] Other (comments)     headaches     Review of Systems: PER HPI    Physical Examination:  - GENERAL: NCAT, Appears well nourished   - EYES: EOMI, non-icteric, no proptosis   - Ear/Nose/Throat: NCAT, no visible inflammation or masses   - CARDIOVASCULAR: no cyanosis, no visible JVD   - RESPIRATORY: respiratory effort normal without any distress or labored breathing   - MUSCULOSKELETAL: Normal ROM of neck and upper extremities observed   - SKIN: No rash on face  - NEUROLOGIC:  No facial asymmetry (Cranial nerve 7 motor function), No gaze palsy   - PSYCHIATRIC: Normal affect, Normal insight and judgement       Data Reviewed:   Component      Latest Ref Rng & Units 2/8/2023   Glucose      70 - 99 mg/dL 103 (H)   BUN      6 - 24 mg/dL 19   Creatinine      0.57 - 1.00 mg/dL 0.45 (L)   eGFR      >59 mL/min/1.73 111   BUN/Creatinine ratio      9 - 23 42 (H)   Sodium      134 - 144 mmol/L 140   Potassium      3.5 - 5.2 mmol/L 4.8   Chloride      96 - 106 mmol/L 100   CO2      20 - 29 mmol/L 24   Calcium      8.7 - 10.2 mg/dL 9.9   Protein, total      6.0 - 8.5 g/dL 7.2   Albumin      3.8 - 4.9 g/dL 4.1   GLOBULIN, TOTAL      1.5 - 4.5 g/dL 3.1   A-G Ratio      1.2 - 2.2 1.3   Bilirubin, total      0.0 - 1.2 mg/dL 0.4   Alk. phosphatase      44 - 121 IU/L 60   AST      0 - 40 IU/L 24   ALT      0 - 32 IU/L 13   Cholesterol, total      100 - 199 mg/dL 220 (H)   Triglyceride      0 - 149 mg/dL 136   HDL Cholesterol      >39 mg/dL 37 (L)   VLDL, calculated      5 - 40 mg/dL 25   LDL, calculated      0 - 99 mg/dL 158 (H)   VITAMIN D, 25-HYDROXY      30.0 - 100.0 ng/mL 34.9   T4, Free      0.82 - 1.77 ng/dL 1.66   TSH      0.450 - 4.500 uIU/mL 1.880       Assessment/Plan:     1.  Osteopenia: She was diagnosed with this based on DXA at Dr. Hortensia Batista office in Nov 2012 that showed November 2012 that showed T-score at her lumbar spine was -1.2 which was consistent with osteopenia, -0.4 at the left total hip which was normal and -1.4 at the left femoral neck which was also consistent with osteopenia and started developing stress fractures in her feet. She has risk factors for low bone density of an almost 30 pack year history of smoking, chronic anti-seizure meds with dilantin and depakote, intermittent courses of prednisone 1-2 times a year for sinus issues and also being in a post-menopausal state. She was started on alendronate 70 mg weekly by PCP and repeat DXA in 1/17 showed:- L-spine: T-score -1.0 (2.3% increase since 2012), left total hip: T-score -0.1 (3% increase since 2012), and left femoral neck: T-score -0.9 (8.6% increase since 2012) so I stopped the alendronate as everything was back to normal at that time. Repeat DXA in 2/19 showed T-score of 0.8 at L-spine (1.9% increase since 1/17), 0.1 at left total hip (3.1% increase since 1/17) and -0.3 at left fem neck (8.9% increase since 1/17). Given everything remains normal, will repeat a scan in 5 years. - repeat DXA in 2/24 at [facility] [address]. Hypothyroidism (acquired): TSH 2.019 in 6/16 on levothyroxine 75 mcg daily but had gained 8 lbs since then and TSH was 2.79 in 1/17 so increased her dose to 88 mcg daily but had been taking with food and coffee and TSH 2.15 in 5/17 so had her separate from food and coffee but only down to 2.02 in 11/17 so wanted her to change to brand unithroid in case her levels are fluctuating on the generic but she decided not to do this as she felt well and TSH still 1.79 in 5/18 and 1.73 in 2/19 so kept her dose the same. TSH up to 3.57 in 2/20 so increased dose to 100 mcg daily and TSH 2.2 in 5/20 so increased to 112 mcg daily and TSH 1.82 in 2/21 so kept dose the same.   TSH 2.09 in 2/22 so increased to 125 mcg daily and TSH 2.64 in 4/22 so increased to 137 mcg daily and TSH 2.18 in 7/22 so changed to brand levoxyl 137 mcg daily and TSH 1.88 in 2/23 so kept dose the same. - cont levoxyl 137 mcg daily   - check TSH and free T4 prior to next visit        3. Hypercalciuria: was found to have a 24 hour urine calcium of 495 in 10/16 by Dr. Robinson Sauceda. She decided to stop her calcium supplements at that time. Her vitamin D was normal at 38 in 10/16 and her PTH was normal at 30. Her repeat was still high at 361 in 1/17 so I added hctz 12.5 mg daily and down to 257 in 4/17 so continued this dose and repeat in 5/18 was still 178 so stayed on this dose and repeat in 5/20 was 181 so won't need to keep following this. Her serum calcium was 10.4 in 5/17 so stopped her vitamin D 1000 units and mvi daily and back to 10.0 in 11/17. D level down to 25 in 5/18 so added back 1000 of D3 daily and level up to 36 in 2/19. However calcium gail from 9.2 in 8/18 to 10.4 in 2/19 after hctz was increased from 12.5 mg to 25 mg daily so decreased her dose back to 12.5 mg daily and calcium 9.6 in 3/19 and 9.7 in 2/20 and 10.0 in 2/21. Vitamin D 35 in 2/20 and 35 in 2/21. Calcium 10.4 and vitamin D 44 in 2/22 so stopped the mvi and calcium 10.1 in 4/22 and 9.9 in 2/23. Vitamin D 34.9 in 2/23  - cont hctz 12.5 mg daily   - cont vitamin D 1000 units daily  - follow calcium on metabolic panels            4. Essential hypertension, benign: her BP was at goal < 140/90 at last in person visit in 2/20 but hasn't checked at home  - cont losartan 100 mg daily  - cont hctz 12.5 mg daily  - monitor home blood pressure readings        5. Hyperlipidemia LDL goal <100:  in 2/23 on pravastatin 40 mg daily but had been eating a higher fat diet over the holidays. - cont pravastatin 40 mg daily  - check lipids prior to next visit       6.  Impaired fasting glucose: fbs 103 in 2/23.  - check A1c and cmp prior to next visit            Patient Instructions   1) Your LDL (bad cholesterol) was 158 and goal is under 100. Try to limit the amount of fried foods, fatty foods, butter, gravy, red meat, ice cream, cheese and eggs in your diet, which are all high in cholesterol. 2) Your fasting sugar was 103 and borderline diabetes is between 100-125. Try not to eat more than 45 grams of carbs per meal (1 palm/fist sized serving = 30 grams; carbs are potatoes, rice, pasta, bread, fruit, corn, peas, cereal, desserts)    3) If you are able to lose 10% of your body weight, which would be 24 lbs, over the next 6 months, then this should help your blood pressure, cholesterol and blood sugar. 4) TSH is a thyroid test.  Your level is 1.8 which is normal and at goal of 0.45-2.0. This test goes opposite of your thyroid dose and suggests your dose of levoxyl is perfect so I will keep your dose the same. 5) BUN and creatinine are markers of kidney function. Your values are normal.  Although your creatinine is low, I'm not concerned about this as I only worry if your value is high and this made your BUN/Creatinine ratio high, which again, I'm not concerned about. 6) Your vitamin D level is 1000 which is normal.  Goal is over 30. Please continue to take your current dose of vitamin D to keep your levels at goal.    7) Please come for a follow up visit on 8/31/23 at 8:50am in our [place] office    8) I put a lab order directly into the [employer] portal to use to repeat your labs prior to your next visit. Go under menu and my record and scroll down to upcoming tests and procedures and please print this off to bring a copy of the order to the lab. If you are unable to print this off, then let me know and I can mail you an order as we will be having a computer system upgrade in May and I have been informed the order will likely not cross directly into the [employer] system so I want to be sure you have a hard copy to take to the lab.         Follow-up and Dispositions    Return 8/31/23 at 8:50am. Jesus Manuel Tripathi, was evaluated through a synchronous (real-time) audio-video encounter. The patient (or guardian if applicable) is aware that this is a billable service, which includes applicable co-pays. This Virtual Visit was conducted with patient's (and/or legal guardian's) consent. The visit was conducted pursuant to the emergency declaration under the [address] [place] [address] and the [employer] General Act. Patient identification was verified, and a caregiver was present when appropriate. The patient was located at: [facility] [address]  The provider was located at: Facility [facility] [address] [place] [address]          --Alyssa Russo MD on 2/16/2023 at 9:59 AM    An electronic signature was used to authenticate this note.       Copy sent to:  Dr. Corrine Mcmahon via [facility]  Dr. Milagro Ko

## 2023-02-16 NOTE — PATIENT INSTRUCTIONS
1) Your LDL (bad cholesterol) was 158 and goal is under 100. Try to limit the amount of fried foods, fatty foods, butter, gravy, red meat, ice cream, cheese and eggs in your diet, which are all high in cholesterol. 2) Your fasting sugar was 103 and borderline diabetes is between 100-125. Try not to eat more than 45 grams of carbs per meal (1 palm/fist sized serving = 30 grams; carbs are potatoes, rice, pasta, bread, fruit, corn, peas, cereal, desserts)    3) If you are able to lose 10% of your body weight, which would be 24 lbs, over the next 6 months, then this should help your blood pressure, cholesterol and blood sugar. 4) TSH is a thyroid test.  Your level is 1.8 which is normal and at goal of 0.45-2.0. This test goes opposite of your thyroid dose and suggests your dose of levoxyl is perfect so I will keep your dose the same. 5) BUN and creatinine are markers of kidney function. Your values are normal.  Although your creatinine is low, I'm not concerned about this as I only worry if your value is high and this made your BUN/Creatinine ratio high, which again, I'm not concerned about. 6) Your vitamin D level is 1000 which is normal.  Goal is over 30. Please continue to take your current dose of vitamin D to keep your levels at goal.    7) Please come for a follow up visit on 8/31/23 at 8:50am in our [place] office    8) I put a lab order directly into the [employer] portal to use to repeat your labs prior to your next visit. Go under menu and my record and scroll down to upcoming tests and procedures and please print this off to bring a copy of the order to the lab. If you are unable to print this off, then let me know and I can mail you an order as we will be having a computer system upgrade in May and I have been informed the order will likely not cross directly into the [employer] system so I want to be sure you have a hard copy to take to the lab.

## 2023-04-03 ENCOUNTER — OFFICE VISIT (OUTPATIENT)
Dept: NEUROLOGY | Age: 58
End: 2023-04-03
Payer: COMMERCIAL

## 2023-04-03 DIAGNOSIS — G40.209 COMPLEX PARTIAL SEIZURE EVOLVING TO GENERALIZED SEIZURE (HCC): ICD-10-CM

## 2023-04-03 DIAGNOSIS — E03.4 HYPOTHYROIDISM DUE TO ACQUIRED ATROPHY OF THYROID: ICD-10-CM

## 2023-04-03 PROCEDURE — 99213 OFFICE O/P EST LOW 20 MIN: CPT | Performed by: PSYCHIATRY & NEUROLOGY

## 2023-04-03 PROCEDURE — 3079F DIAST BP 80-89 MM HG: CPT | Performed by: PSYCHIATRY & NEUROLOGY

## 2023-04-03 PROCEDURE — 3075F SYST BP GE 130 - 139MM HG: CPT | Performed by: PSYCHIATRY & NEUROLOGY

## 2023-04-03 RX ORDER — CLONAZEPAM 0.5 MG/1
0.5 TABLET ORAL
Qty: 30 TABLET | Refills: 5 | Status: SHIPPED | OUTPATIENT
Start: 2023-04-03

## 2023-04-03 NOTE — LETTER
4/3/2023    Patient: Angelia Velasquez   YOB: 1965   Date of Visit: 4/3/2023     Saran Joel, [address] [address]  Via Fax: [phone]    Dear Saran Joel MD,      Thank you for referring Ms. Chau Vazquez to [facility] for evaluation. My notes for this consultation are attached. Consult        REFERRED BY:  Zoila Hudson MD    CHIEF COMPLAINT: Seizures      Subjective:     Angelia Velasquez is a 61 y.o. right-handed  female, seen for follow-up of her seizures, and the patient currently takes 750 mg of Depakote in the morning, 500 at lunch, and 750 mg at night, and had a recent Depakote level done that was 82 , she has had no seizures since seen last year, and her last seizure was 1988, and she remained under good control. She was previously on Dilantin went off of that because of osteoporosis, and is doing well with her endocrinologist and that has been corrected. She questions whether she should get the vaccine, and we strongly advised her to get the vaccine as soon as possible when it was her time. 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 She is doing well on Depakote. Routine metabolic studies are drawn today and her Depakote was renewed also for her. Has had no other new medical problems, neurological problems, and continues to do well. We discussed the possible benefits of coming off the medication, but she would prefer to take the medication because she is done so well for so long and does not want to risk having a seizure. She has had no new medical problems or side effects of the medication or any other new neurological problem or new focal neurological symptoms. She has not had any recent blood work done of her liver or white count so we will do that to make sure there are no complications from her Depakote.   Her last blood test 1 year ago were okay. Past Medical History:   Diagnosis Date    Arthritis     Hyperlipidemia     Hypothyroidism (acquired)     Iron deficiency anemia     Osteopenia 2013    Seizures (HCC)     Unspecified epilepsy without mention of intractable epilepsy       Past Surgical History:   Procedure Laterality Date    HX  SECTION       Family History   Problem Relation Age of Onset    Other Mother         fibromyalgia, sarcoidosis    Thyroid Disease Mother         hypothyroidism    Other Father 27        car accident    Heart Disease Maternal Grandmother     Cancer Maternal Grandmother         kidney    Cancer Maternal Grandfather         colon      Social History     Tobacco Use    Smoking status: Former     Packs/day: 1.50     Years: 19.00     Pack years: 28.50     Types: Cigarettes     Quit date: 2003     Years since quittin.2    Smokeless tobacco: Former   Substance Use Topics    Alcohol use: No         Current Outpatient Medications:     clonazePAM (KlonoPIN) 0.5 mg tablet, Take 1 Tablet by mouth every eight (8) hours as needed (seizure). , Disp: 30 Tablet, Rfl: 5    divalproex DR (DEPAKOTE) 250 mg tablet, TAKE 3 TABLETS BY MOUTH IN THE MORNING, 2 TABS AT LUNCH TIME, & 3 TABS AT NIGHT TIME  Indications: convulsive seizures, Disp: 720 Tablet, Rfl: 5    LevoxyL 137 mcg tablet, Take 1 Tablet by mouth Daily (before breakfast). BRAND MEDICALLY NECESSARY, Disp: 90 Tablet, Rfl: 3    losartan (COZAAR) 100 mg tablet, TAKE 1 TABLET BY MOUTH DAILY. REPLACES LOSARTAN-HCTZ 100/12.5 MG TABS, Disp: 90 Tablet, Rfl: 3    pravastatin (PRAVACHOL) 40 mg tablet, Take 1 Tablet by mouth nightly., Disp: 90 Tablet, Rfl: 3    hydroCHLOROthiazide (HYDRODIURIL) 12.5 mg tablet, TAKE 1 TABLET BY MOUTH DAILY.  REPLACES LOSARTAN-HCTZ 100/12.5 MG TABS, Disp: 90 Tablet, Rfl: 3    CRANBERRY PO, Take  by mouth two (2) times a day., Disp: , Rfl:     cholecalciferol (VITAMIN D3) 25 mcg (1,000 unit) cap, Take  by mouth daily., Disp: , Rfl:         Allergies   Allergen Reactions    Crestor [Rosuvastatin] Myalgia    Lisinopril Cough    Macrobid [Nitrofurantoin Monohyd/M-Cryst] Other (comments)     headaches      MRI Results (most recent):  Results from Hospital Encounter encounter on 12/17/12    MRI FOOT LT WO CONT    Narrative  **Final Report**      ICD Codes / Adm. Diagnosis: 729.5  845.10 / Pain in limb  Sprain of foot,  unspecified si  Examination:  MR FOOT WO CON LT  - [id number] - Dec 17 2012  1:07PM  Accession No:  [id number]  Reason:  FOOT SPRAIN, FOOT REGION: Whole      REPORT:  INDICATION: Left foot pain, sprain    COMPARISON: None    EXAM: Sagittal T1-weighted spin-echo and fat-suppressed T2-weighted fast  spin-echo, sagittal inversion recovery, and short axis and long axis  TI-weighted spin-echo and fat-suppressed T2-weighted fast spin-echo MR  images of the left forefoot are obtained. FINDINGS: There is a moderately prominent subcutaneous edema dorsal to the  third and fourth metatarsal bones. There is diffusely abnormal signal of  the third and fourth metatarsal distal diaphyses, necks and heads, with  parosteal edema-like signal most prominently demonstrated laterally at the  neck of the third metatarsal bone. There is a small effusion of the third  metatarsophalangeal joint. No tendon derangement is evident nor is there  bone or soft tissue mass. IMPRESSION: Edema like signal of the third and fourth metatarsal shafts  through heads. These findings could represent fractures or other causes for  osseous edema including osteomyelitis. Clinical correlation recommended. Signing/Reading Doctor: Amber Mistry ([id number])  Approved: MAURICIO Mistry ([id number])  Dec 17 2012  1:18PM      Results from Hospital Encounter encounter on 12/17/12    MRI FOOT LT WO CONT    Narrative  **Final Report**      ICD Codes / Adm. Diagnosis: 729.5  845.10 / Pain in limb  Sprain of foot,  unspecified si  Examination:   FOOT WO CON LT  - [id number] - Dec 17 2012  1:07PM  Accession No:  [id number]  Reason:  FOOT SPRAIN, FOOT REGION: Whole      REPORT:  INDICATION: Left foot pain, sprain    COMPARISON: None    EXAM: Sagittal T1-weighted spin-echo and fat-suppressed T2-weighted fast  spin-echo, sagittal inversion recovery, and short axis and long axis  TI-weighted spin-echo and fat-suppressed T2-weighted fast spin-echo MR  images of the left forefoot are obtained. FINDINGS: There is a moderately prominent subcutaneous edema dorsal to the  third and fourth metatarsal bones. There is diffusely abnormal signal of  the third and fourth metatarsal distal diaphyses, necks and heads, with  parosteal edema-like signal most prominently demonstrated laterally at the  neck of the third metatarsal bone. There is a small effusion of the third  metatarsophalangeal joint. No tendon derangement is evident nor is there  bone or soft tissue mass. IMPRESSION: Edema like signal of the third and fourth metatarsal shafts  through heads. These findings could represent fractures or other causes for  osseous edema including osteomyelitis. Clinical correlation recommended. Signing/Reading Doctor: Tiffany Herring. Suraj Munoz ([id number])  Approved: MAURICIO Munoz ([id number])  Dec 17 2012  1:18PM    Review of Systems:  A comprehensive review of systems was negative except for: Neurological: positive for seizures   Vitals:    04/03/23 1013 04/03/23 1020 04/03/23 1043   BP: (!) 146/80 (!) 148/80 138/80   Pulse: (!) 104     Resp: 18     SpO2: 98%     Weight: 266 lb (120.7 kg)     Height: 5' 6\" (1.676 m)       Objective:     I      NEUROLOGICAL EXAM:    Appearance: The patient is well developed, well nourished, provides a coherent history and is in no acute distress. Mental Status: Oriented to time, place and person, and the president, cognitive function is normal and speech is fluent and no aphasia or dysarthria. Mood and affect appropriate.    Cranial Nerves: Intact visual fields. Fundi are not testable. BOBO, EOM's full, no nystagmus, no ptosis. Facial sensation is normal to touch. Corneal reflexes are not tested. Facial movement is symmetric. Hearing is normal bilaterally. Palate is midline with normal sternocleidomastoid and trapezius muscles are normal. Tongue is midline. Neck without meningismus   Temporal arteries are not tender or enlarged  TMJ areas are not tender on palpation   Motor:  5/5 strength in upper and lower proximal and distal muscles. Normal bulk and tone. No fasciculations. Rapid alternating movement is symmetric and intact bilaterally   Reflexes:   Deep tendon reflexes, Babinski and clonus could not be tested   Sensory:   Normal to touch, pinprick and vibration and temperature and double simultaneous stimulation cannot be tested. Gait:  Normal gait for patient's age. Tremor:   No tremor noted. Cerebellar:  No abnormal cerebellar signs present on Romberg and tandem testing and finger-nose-finger exam.   Neurovascular:  Cardiac examination and carotid examination and peripheral arterial examination could not be tested           Assessment:       ICD-10-CM ICD-9-CM    1. Complex partial seizure evolving to generalized seizure ([address]  G40.209 345.40 clonazePAM (KlonoPIN) 0.5 mg tablet      VALPROIC ACID      VALPROIC ACID      2. Hypothyroidism due to acquired atrophy of thyroid  E03.4 244.8 clonazePAM (KlonoPIN) 0.5 mg tablet     246.8 VALPROIC ACID      VALPROIC ACID        Active Problems:    * No active hospital problems.  *      Plan:     Patient doing fairly well on her current medication without a seizure and since 1988  We reviewed her medication, and did metabolic studies to make sure her liver and hematologic functions are stable on her medication, and her recent lab level was 82 on the Depakote 1 year ago  She is taking 750 mg in the morning and 500 mg at lunch and 750 mg at night and tolerating the medication well without side effects. Her medication was renewed for her also. She has used the 250 mg Depakote because the larger dose tablets are too hard to swallow for her. She has had no other new medical or neurologic problems. 25 minutes spent with the patient going over all this with her, and follow-up will be in 1 years time or earlier if need be. Signed By: Anthony Martins MD     April 3, 2023           CC: Tavo Nieves MD  FAX: [phone]      If you have questions, please do not hesitate to call me. I look forward to following your patient along with you.       Sincerely,    Anthony Martins MD

## 2023-04-03 NOTE — PROGRESS NOTES
Consult        REFERRED BY:  Jayden Lewis MD    CHIEF COMPLAINT: Seizures      Subjective:     Stephanie Coy is a 61 y.o. right-handed  female, seen for follow-up of her seizures, and the patient currently takes 750 mg of Depakote in the morning, 500 at lunch, and 750 mg at night, and had a recent Depakote level done that was 80 in , she has had no seizures since seen last year, and her last seizure was , and she remained under good control. She was previously on Dilantin went off of that because of osteoporosis, and is doing well with her endocrinologist and that has been corrected. 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 She is doing well on Depakote. Routine Depakote level was drawn today and her Depakote was renewed also for her. Has had no other new medical problems, neurological problems, and continues to do well. We discussed the possible benefits of coming off the medication, but she would prefer to take the medication because she is done so well for so long and does not want to risk having a seizure. We discussed whether or not she could do jury duty. She has had no new medical problems or side effects of the medication or any other new neurological problem or new focal neurological symptoms. She has not had any recent blood work done of her liver or white count so we will do that to make sure there are no complications from her Depakote. Her last blood test two year ago were okay.     Past Medical History:   Diagnosis Date    Arthritis     Hyperlipidemia     Hypothyroidism (acquired)     Iron deficiency anemia     Osteopenia 2013    Seizures ([address]     Unspecified epilepsy without mention of intractable epilepsy       Past Surgical History:   Procedure Laterality Date    HX  SECTION       Family History   Problem Relation Age of Onset    Other Mother         fibromyalgia, sarcoidosis    Thyroid Disease Mother hypothyroidism    Other Father 27        car accident    Heart Disease Maternal Grandmother     Cancer Maternal Grandmother         kidney    Cancer Maternal Grandfather         colon      Social History     Tobacco Use    Smoking status: Former     Packs/day: 1.50     Years: 19.00     Pack years: 28.50     Types: Cigarettes     Quit date: 2003     Years since quittin.2    Smokeless tobacco: Former   Substance Use Topics    Alcohol use: No         Current Outpatient Medications:     clonazePAM (KlonoPIN) 0.5 mg tablet, Take 1 Tablet by mouth every eight (8) hours as needed (seizure). , Disp: 30 Tablet, Rfl: 5    divalproex DR (DEPAKOTE) 250 mg tablet, TAKE 3 TABLETS BY MOUTH IN THE MORNING, 2 TABS AT LUNCH TIME, & 3 TABS AT NIGHT TIME  Indications: convulsive seizures, Disp: 720 Tablet, Rfl: 5    LevoxyL 137 mcg tablet, Take 1 Tablet by mouth Daily (before breakfast). BRAND MEDICALLY NECESSARY, Disp: 90 Tablet, Rfl: 3    losartan (COZAAR) 100 mg tablet, TAKE 1 TABLET BY MOUTH DAILY. REPLACES LOSARTAN-HCTZ 100/12.5 MG TABS, Disp: 90 Tablet, Rfl: 3    pravastatin (PRAVACHOL) 40 mg tablet, Take 1 Tablet by mouth nightly., Disp: 90 Tablet, Rfl: 3    hydroCHLOROthiazide (HYDRODIURIL) 12.5 mg tablet, TAKE 1 TABLET BY MOUTH DAILY. REPLACES LOSARTAN-HCTZ 100/12.5 MG TABS, Disp: 90 Tablet, Rfl: 3    CRANBERRY PO, Take  by mouth two (2) times a day., Disp: , Rfl:     cholecalciferol (VITAMIN D3) 25 mcg (1,000 unit) cap, Take  by mouth daily. , Disp: , Rfl:         Allergies   Allergen Reactions    Crestor [Rosuvastatin] Myalgia    Lisinopril Cough    Macrobid [Nitrofurantoin Monohyd/M-Cryst] Other (comments)     headaches      MRI Results (most recent):  Results from Hospital Encounter encounter on 12    MRI FOOT LT WO CONT    Narrative  **Final Report**      ICD Codes / Adm. Diagnosis: 729.5  845.10 / Pain in limb  Sprain of foot,  unspecified si  Examination:  MR FOOT WO CON LT  - [id number] - Dec 17 2012 1:07PM  Accession No:  [id number]  Reason:  FOOT SPRAIN, FOOT REGION: Whole      REPORT:  INDICATION: Left foot pain, sprain    COMPARISON: None    EXAM: Sagittal T1-weighted spin-echo and fat-suppressed T2-weighted fast  spin-echo, sagittal inversion recovery, and short axis and long axis  TI-weighted spin-echo and fat-suppressed T2-weighted fast spin-echo MR  images of the left forefoot are obtained. FINDINGS: There is a moderately prominent subcutaneous edema dorsal to the  third and fourth metatarsal bones. There is diffusely abnormal signal of  the third and fourth metatarsal distal diaphyses, necks and heads, with  parosteal edema-like signal most prominently demonstrated laterally at the  neck of the third metatarsal bone. There is a small effusion of the third  metatarsophalangeal joint. No tendon derangement is evident nor is there  bone or soft tissue mass. IMPRESSION: Edema like signal of the third and fourth metatarsal shafts  through heads. These findings could represent fractures or other causes for  osseous edema including osteomyelitis. Clinical correlation recommended. Signing/Reading Doctor: Verna Jackson ([id number])  Approved: MAURICIO Jackson ([id number])  Dec 17 2012  1:18PM      Results from Hospital Encounter encounter on 12/17/12    MRI FOOT LT WO CONT    Narrative  **Final Report**      ICD Codes / Adm. Diagnosis: 729.5  845.10 / Pain in limb  Sprain of foot,  unspecified si  Examination:   FOOT MEHREEN CON LT  - [id number] - Dec 17 2012  1:07PM  Accession No:  [id number]  Reason:  FOOT SPRAIN, FOOT REGION: Whole      REPORT:  INDICATION: Left foot pain, sprain    COMPARISON: None    EXAM: Sagittal T1-weighted spin-echo and fat-suppressed T2-weighted fast  spin-echo, sagittal inversion recovery, and short axis and long axis  TI-weighted spin-echo and fat-suppressed T2-weighted fast spin-echo MR  images of the left forefoot are obtained.     FINDINGS: There is a moderately prominent subcutaneous edema dorsal to the  third and fourth metatarsal bones. There is diffusely abnormal signal of  the third and fourth metatarsal distal diaphyses, necks and heads, with  parosteal edema-like signal most prominently demonstrated laterally at the  neck of the third metatarsal bone. There is a small effusion of the third  metatarsophalangeal joint. No tendon derangement is evident nor is there  bone or soft tissue mass. IMPRESSION: Edema like signal of the third and fourth metatarsal shafts  through heads. These findings could represent fractures or other causes for  osseous edema including osteomyelitis. Clinical correlation recommended. Signing/Reading Doctor: Ellie Grier ([id number])  Approved: MAURICIO Grier ([id number])  Dec 17 2012  1:18PM    Review of Systems:  A comprehensive review of systems was negative except for: Neurological: positive for seizures   Vitals:    04/03/23 1013 04/03/23 1020 04/03/23 1043   BP: (!) 146/80 (!) 148/80 138/80   Pulse: (!) 104     Resp: 18     SpO2: 98%     Weight: 266 lb (120.7 kg)     Height: 5' 6\" (1.676 m)       Objective:     I      NEUROLOGICAL EXAM:    Appearance: The patient is well developed, well nourished, provides a coherent history and is in no acute distress. Mental Status: Oriented to time, place and person, and the president, cognitive function is normal and speech is fluent and no aphasia or dysarthria. Mood and affect appropriate. Cranial Nerves:   Intact visual fields. Fundi are benign, with no lesions seen on the retina and optic nerves are normal.. BOBO, EOM's full, no nystagmus, no ptosis. Facial sensation is normal to touch. Corneal reflexes are not tested. Facial movement is symmetric. Hearing is normal bilaterally. Palate is midline with normal sternocleidomastoid and trapezius muscles are normal. Tongue is midline.   Neck without meningismus   Temporal arteries are not tender or enlarged  TMJ areas are not tender on palpation   Motor:  5/5 strength in upper and lower proximal and distal muscles. Normal bulk and tone. No fasciculations. Rapid alternating movement is symmetric and intact bilaterally   Reflexes:   Deep tendon reflexes are symmetric and equal, Babinski and clonus were absent   Sensory:   Normal to touch, pinprick and vibration and temperature and double simultaneous stimulation   Gait:  Normal gait for patient's age. Tremor:   No tremor noted. Cerebellar:  No abnormal cerebellar signs present on Romberg and tandem testing and finger-nose-finger exam.   Neurovascular:  Cardiac examination and carotid examination showed no bruits and peripheral arterial examination was normal           Assessment:       ICD-10-CM ICD-9-CM    1. Complex partial seizure evolving to generalized seizure ([facility] [address]  G40.209 345.40 clonazePAM (KlonoPIN) 0.5 mg tablet      VALPROIC ACID      VALPROIC ACID      2. Hypothyroidism due to acquired atrophy of thyroid  E03.4 244.8 clonazePAM (KlonoPIN) 0.5 mg tablet     246.8 VALPROIC ACID      VALPROIC ACID        Active Problems:    * No active hospital problems. *      Plan:     Patient needs Klonopin refilled so we gave her 1 today. She is to use that sparingly only when she thinks she might have a seizure. We discussed the pros and cons of surgery for her. She is overweight, has a high cholesterol, and we advised her that she needs to stay physically mentally active, exercise at least a half an hour a day of mild exercise, and eat a Mediterranean type diet. She is working with her endocrinologist for those problems.   Patient doing fairly well on her current medication without a seizure and since 1988  We reviewed her medication, and did metabolic studies to make sure her liver and hematologic functions are stable on her medication, and her recent lab level was 82 on the Depakote 2 year ago  She is taking 750 mg in the morning and 500 mg at lunch and 750 mg at night and tolerating the medication well without side effects. Her medication was renewed for her also. She has used the 250 mg Depakote because the larger dose tablets are too hard to swallow for her. She has had no other new medical or neurologic problems. We did recommend a multivitamin every day and vitamin D every day because of her seizure medications and for general health. 28 minutes spent with the patient going over all this with her, and follow-up will be in 1 years time or earlier if need be.     Signed By: Quique Harden MD     April 3, 2023           CC: Annel Link MD  FAX: [phone]

## 2023-04-04 LAB — VALPROATE SERPL-MCNC: 74 UG/ML (ref 50–100)

## 2023-08-17 DIAGNOSIS — I10 ESSENTIAL HYPERTENSION, BENIGN: ICD-10-CM

## 2023-08-17 DIAGNOSIS — R73.01 IMPAIRED FASTING GLUCOSE: ICD-10-CM

## 2023-08-17 DIAGNOSIS — M85.80 OSTEOPENIA, UNSPECIFIED LOCATION: ICD-10-CM

## 2023-08-17 DIAGNOSIS — E78.5 HYPERLIPIDEMIA LDL GOAL <100: ICD-10-CM

## 2023-08-17 DIAGNOSIS — R82.994 HYPERCALCIURIA: ICD-10-CM

## 2023-08-17 DIAGNOSIS — E03.9 HYPOTHYROIDISM (ACQUIRED): ICD-10-CM

## 2023-08-26 LAB
ALBUMIN SERPL-MCNC: 4.1 G/DL (ref 3.8–4.9)
ALBUMIN/GLOB SERPL: 1.6 {RATIO} (ref 1.2–2.2)
ALP SERPL-CCNC: 58 IU/L (ref 44–121)
ALT SERPL-CCNC: 16 IU/L (ref 0–32)
AST SERPL-CCNC: 22 IU/L (ref 0–40)
BILIRUB SERPL-MCNC: 0.4 MG/DL (ref 0–1.2)
BUN SERPL-MCNC: 15 MG/DL (ref 6–24)
BUN/CREAT SERPL: 31 (ref 9–23)
CALCIUM SERPL-MCNC: 9.5 MG/DL (ref 8.7–10.2)
CHLORIDE SERPL-SCNC: 99 MMOL/L (ref 96–106)
CHOLEST SERPL-MCNC: 212 MG/DL (ref 100–199)
CO2 SERPL-SCNC: 22 MMOL/L (ref 20–29)
CREAT SERPL-MCNC: 0.49 MG/DL (ref 0.57–1)
EGFRCR SERPLBLD CKD-EPI 2021: 108 ML/MIN/1.73
EST. AVERAGE GLUCOSE BLD GHB EST-MCNC: 146 MG/DL
GLOBULIN SER CALC-MCNC: 2.5 G/DL (ref 1.5–4.5)
GLUCOSE SERPL-MCNC: 135 MG/DL (ref 70–99)
HBA1C MFR BLD: 6.7 % (ref 4.8–5.6)
HDLC SERPL-MCNC: 40 MG/DL
IMP & REVIEW OF LAB RESULTS: NORMAL
LDLC SERPL CALC-MCNC: 146 MG/DL (ref 0–99)
POTASSIUM SERPL-SCNC: 4.2 MMOL/L (ref 3.5–5.2)
PROT SERPL-MCNC: 6.6 G/DL (ref 6–8.5)
SODIUM SERPL-SCNC: 139 MMOL/L (ref 134–144)
T4 FREE SERPL-MCNC: 1.56 NG/DL (ref 0.82–1.77)
TRIGL SERPL-MCNC: 141 MG/DL (ref 0–149)
TSH SERPL DL<=0.005 MIU/L-ACNC: 1.86 UIU/ML (ref 0.45–4.5)
VLDLC SERPL CALC-MCNC: 26 MG/DL (ref 5–40)

## 2023-08-31 ENCOUNTER — OFFICE VISIT (OUTPATIENT)
Age: 58
End: 2023-08-31
Payer: COMMERCIAL

## 2023-08-31 VITALS
BODY MASS INDEX: 43.87 KG/M2 | WEIGHT: 273 LBS | SYSTOLIC BLOOD PRESSURE: 131 MMHG | HEIGHT: 66 IN | DIASTOLIC BLOOD PRESSURE: 70 MMHG | HEART RATE: 97 BPM

## 2023-08-31 DIAGNOSIS — E78.5 HYPERLIPIDEMIA LDL GOAL <100: ICD-10-CM

## 2023-08-31 DIAGNOSIS — M85.80 OSTEOPENIA, UNSPECIFIED LOCATION: Primary | ICD-10-CM

## 2023-08-31 DIAGNOSIS — E03.9 HYPOTHYROIDISM (ACQUIRED): ICD-10-CM

## 2023-08-31 DIAGNOSIS — R82.994 HYPERCALCIURIA: ICD-10-CM

## 2023-08-31 DIAGNOSIS — R73.01 IMPAIRED FASTING GLUCOSE: ICD-10-CM

## 2023-08-31 DIAGNOSIS — I10 ESSENTIAL (PRIMARY) HYPERTENSION: ICD-10-CM

## 2023-08-31 PROCEDURE — 3075F SYST BP GE 130 - 139MM HG: CPT | Performed by: INTERNAL MEDICINE

## 2023-08-31 PROCEDURE — 99214 OFFICE O/P EST MOD 30 MIN: CPT | Performed by: INTERNAL MEDICINE

## 2023-08-31 PROCEDURE — 3078F DIAST BP <80 MM HG: CPT | Performed by: INTERNAL MEDICINE

## 2023-08-31 RX ORDER — LEVOTHYROXINE SODIUM 137 UG/1
137 TABLET ORAL DAILY
COMMUNITY

## 2023-08-31 NOTE — PROGRESS NOTES
Chief Complaint   Patient presents with    Thyroid Problem     PCP and Pharmacy verified     History of Present Illness: Filiberto Reich is a 60 y.o. female here for follow up of thyroid. Weight up 28 lbs since last visit in person in 2/20. Her step-father, Brittany Rossi, just passed away 2.5 weeks at [facility] after having a massive stroke and brain bleed. She admits to being off track with her diet over the past 3 years and does not want to go on any meds at this time and wants to get serious about her diet so gave her a plan of intermittent fasting and lower carbs as listed below. Compliant with levoxyl and pravastatin and hctz and losartan. Current Outpatient Medications   Medication Sig    LEVOXYL 137 MCG tablet Take 1 tablet by mouth Daily    CRANBERRY PO Take by mouth 2 times daily    vitamin D 25 MCG (1000 UT) CAPS Take by mouth daily    clonazePAM (KLONOPIN) 0.5 MG tablet Take by mouth every 8 hours as needed. divalproex (DEPAKOTE) 250 MG DR tablet TAKE 3 TABLETS BY MOUTH IN THE MORNING, 2 TABS AT LUNCH TIME, & 3 TABS AT NIGHT TIME  Indications: convulsive seizures    hydroCHLOROthiazide (HYDRODIURIL) 12.5 MG tablet TAKE 1 TABLET BY MOUTH DAILY. REPLACES LOSARTAN-HCTZ 100/12.5 MG TABS    losartan (COZAAR) 100 MG tablet TAKE 1 TABLET BY MOUTH DAILY. REPLACES LOSARTAN-HCTZ 100/12.5 MG TABS    pravastatin (PRAVACHOL) 40 MG tablet Take by mouth     No current facility-administered medications for this visit. Allergies   Allergen Reactions    Lisinopril Cough    Nitrofurantoin Other (See Comments)     headaches    Rosuvastatin Myalgia     Review of Systems: PER HPI    Physical Examination:  Blood pressure 131/70, pulse 97, height 5' 6\" (1.676 m), weight 273 lb (123.8 kg).   General: pleasant, no distress, good eye contact   Neck: no carotid bruits  Cardiovascular: regular, normal rate, nl s1 and s2, no m/r/g   Respiratory: clear to auscultation bilaterally   Integumentary: skin is normal, trace

## 2023-12-07 RX ORDER — LEVOTHYROXINE SODIUM 137 UG/1
TABLET ORAL
Qty: 90 TABLET | Refills: 3 | Status: SHIPPED | OUTPATIENT
Start: 2023-12-07

## 2023-12-07 RX ORDER — PRAVASTATIN SODIUM 40 MG
40 TABLET ORAL NIGHTLY
Qty: 90 TABLET | Refills: 3 | Status: SHIPPED | OUTPATIENT
Start: 2023-12-07

## 2023-12-07 RX ORDER — LOSARTAN POTASSIUM 100 MG/1
TABLET ORAL
Qty: 90 TABLET | Refills: 3 | Status: SHIPPED | OUTPATIENT
Start: 2023-12-07

## 2023-12-07 RX ORDER — HYDROCHLOROTHIAZIDE 12.5 MG/1
TABLET ORAL
Qty: 90 TABLET | Refills: 3 | Status: SHIPPED | OUTPATIENT
Start: 2023-12-07

## 2024-02-12 ENCOUNTER — TELEPHONE (OUTPATIENT)
Age: 59
End: 2024-02-12

## 2024-02-12 NOTE — TELEPHONE ENCOUNTER
Please call pt to let her know she has an unread message in [employer]:    It's up to you.  I'm happy to see you at the end of the month and see your progress so far but if you want to move the appointment out, let me know when you want to come as I likely will have to add you on somewhere as the schedule is booked out until the end of May at this point.  Thanks!      Previous Messages    ----- Message -----       From:Akosua Cross       Sent:2/7/2024  4:30 PM EST         To:Dr. Hua De La Garza    Subject:February appt.    Dr. De La Garza,  I didn't start on my keto diet until 1-1-2024. I feel like I am doing pretty good.. just wanted to know if you still want me to come the end of this month or wait a couple more months?  Thank you.

## 2024-02-13 NOTE — TELEPHONE ENCOUNTER
Informed pt Dr. De La Garza has sent a [employer] message that you have not yet read.  Please read this as soon as possible. Pt verbalized understanding.

## 2024-02-16 DIAGNOSIS — E03.9 HYPOTHYROIDISM (ACQUIRED): ICD-10-CM

## 2024-02-16 DIAGNOSIS — I10 ESSENTIAL (PRIMARY) HYPERTENSION: ICD-10-CM

## 2024-02-16 DIAGNOSIS — R73.01 IMPAIRED FASTING GLUCOSE: ICD-10-CM

## 2024-02-16 DIAGNOSIS — E78.5 HYPERLIPIDEMIA LDL GOAL <100: ICD-10-CM

## 2024-02-22 LAB
ALBUMIN SERPL-MCNC: 4.3 G/DL (ref 3.8–4.9)
ALBUMIN/GLOB SERPL: 1.6 {RATIO} (ref 1.2–2.2)
ALP SERPL-CCNC: 53 IU/L (ref 44–121)
ALT SERPL-CCNC: 21 IU/L (ref 0–32)
AST SERPL-CCNC: 30 IU/L (ref 0–40)
BILIRUB SERPL-MCNC: 0.5 MG/DL (ref 0–1.2)
BUN SERPL-MCNC: 14 MG/DL (ref 6–24)
BUN/CREAT SERPL: 33 (ref 9–23)
CALCIUM SERPL-MCNC: 9.9 MG/DL (ref 8.7–10.2)
CHLORIDE SERPL-SCNC: 101 MMOL/L (ref 96–106)
CHOLEST SERPL-MCNC: 191 MG/DL (ref 100–199)
CO2 SERPL-SCNC: 20 MMOL/L (ref 20–29)
CREAT SERPL-MCNC: 0.43 MG/DL (ref 0.57–1)
EGFRCR SERPLBLD CKD-EPI 2021: 112 ML/MIN/1.73
GLOBULIN SER CALC-MCNC: 2.7 G/DL (ref 1.5–4.5)
GLUCOSE SERPL-MCNC: 106 MG/DL (ref 70–99)
HBA1C MFR BLD: 5.7 % (ref 4.8–5.6)
HDLC SERPL-MCNC: 32 MG/DL
LDLC SERPL CALC-MCNC: 127 MG/DL (ref 0–99)
POTASSIUM SERPL-SCNC: 3.9 MMOL/L (ref 3.5–5.2)
PROT SERPL-MCNC: 7 G/DL (ref 6–8.5)
SODIUM SERPL-SCNC: 142 MMOL/L (ref 134–144)
T4 FREE SERPL-MCNC: 1.87 NG/DL (ref 0.82–1.77)
TRIGL SERPL-MCNC: 180 MG/DL (ref 0–149)
TSH SERPL DL<=0.005 MIU/L-ACNC: 0.21 UIU/ML (ref 0.45–4.5)
VLDLC SERPL CALC-MCNC: 32 MG/DL (ref 5–40)

## 2024-02-23 LAB
ALBUMIN/CREAT UR: 8 MG/G CREAT (ref 0–29)
CREAT UR-MCNC: 97.6 MG/DL
IMP & REVIEW OF LAB RESULTS: NORMAL
MICROALBUMIN UR-MCNC: 7.9 UG/ML

## 2024-02-29 ENCOUNTER — OFFICE VISIT (OUTPATIENT)
Age: 59
End: 2024-02-29
Payer: COMMERCIAL

## 2024-02-29 VITALS
HEART RATE: 84 BPM | SYSTOLIC BLOOD PRESSURE: 138 MMHG | DIASTOLIC BLOOD PRESSURE: 66 MMHG | WEIGHT: 249.6 LBS | BODY MASS INDEX: 40.11 KG/M2 | HEIGHT: 66 IN

## 2024-02-29 DIAGNOSIS — E78.5 HYPERLIPIDEMIA LDL GOAL <100: ICD-10-CM

## 2024-02-29 DIAGNOSIS — E03.9 HYPOTHYROIDISM (ACQUIRED): ICD-10-CM

## 2024-02-29 DIAGNOSIS — M85.80 OSTEOPENIA, UNSPECIFIED LOCATION: Primary | ICD-10-CM

## 2024-02-29 DIAGNOSIS — I10 ESSENTIAL (PRIMARY) HYPERTENSION: ICD-10-CM

## 2024-02-29 DIAGNOSIS — R82.994 HYPERCALCIURIA: ICD-10-CM

## 2024-02-29 DIAGNOSIS — R73.01 IMPAIRED FASTING GLUCOSE: ICD-10-CM

## 2024-02-29 PROCEDURE — 3078F DIAST BP <80 MM HG: CPT | Performed by: INTERNAL MEDICINE

## 2024-02-29 PROCEDURE — 3075F SYST BP GE 130 - 139MM HG: CPT | Performed by: INTERNAL MEDICINE

## 2024-02-29 PROCEDURE — 99214 OFFICE O/P EST MOD 30 MIN: CPT | Performed by: INTERNAL MEDICINE

## 2024-02-29 RX ORDER — ATORVASTATIN CALCIUM 20 MG/1
20 TABLET, FILM COATED ORAL
Qty: 30 TABLET | Refills: 11 | Status: SHIPPED | OUTPATIENT
Start: 2024-02-29

## 2024-02-29 RX ORDER — LEVOTHYROXINE SODIUM 137 UG/1
TABLET ORAL
Qty: 90 TABLET | Refills: 3
Start: 2024-02-29

## 2024-02-29 NOTE — PROGRESS NOTES
Chief Complaint   Patient presents with    Thyroid Problem     PCP and pharmacy confirmed     History of Present Illness: Akosua Cross is a 58 y.o. female here for follow up of thyroid.  Weight down 24 lbs since last visit in 8/23.  All of this weight loss has occurred since January and has been following a keto diet.  Compliant with hctz and losartan and levoxyl and pravastatin.  Has noticed some increased tremors recently but no chest pain or palpitations or heat intolerance.  Some increase in constipation.  Willing to change her statin.  This was her first [place] after losing her father, Jus Epps.       Current Outpatient Medications   Medication Sig    hydroCHLOROthiazide (HYDRODIURIL) 12.5 MG tablet TAKE 1 TABLET DAILY (REPLACES LOSARTAN-HCTZ 100/12.5 MG TABLETS)    pravastatin (PRAVACHOL) 40 MG tablet TAKE 1 TABLET NIGHTLY    losartan (COZAAR) 100 MG tablet TAKE 1 TABLET DAILY (REPLACES LOSARTAN-HCTZ 100/12.5 MG TABLETS)    LEVOXYL 137 MCG tablet TAKE 1 TABLET DAILY BEFORE BREAKFAST    CRANBERRY PO Take by mouth 2 times daily    vitamin D 25 MCG (1000 UT) CAPS Take by mouth daily    clonazePAM (KLONOPIN) 0.5 MG tablet Take by mouth every 8 hours as needed.    divalproex (DEPAKOTE) 250 MG DR tablet TAKE 3 TABLETS BY MOUTH IN THE MORNING, 2 TABS AT LUNCH TIME, & 3 TABS AT NIGHT TIME  Indications: convulsive seizures     No current facility-administered medications for this visit.     Allergies   Allergen Reactions    Lisinopril Cough    Nitrofurantoin Other (See Comments)     headaches    Rosuvastatin Myalgia       Review of Systems: PER HPI    Physical Examination:  Blood pressure 138/66, pulse 84, height 1.676 m (5' 6\"), weight 113.2 kg (249 lb 9.6 oz).  General: pleasant, no distress, good eye contact   Neck: no carotid bruits  Cardiovascular: regular, normal rate, nl s1 and s2, no m/r/g   Respiratory: clear to auscultation bilaterally   Integumentary: skin is normal, no edema  Neurological:

## 2024-02-29 NOTE — PATIENT INSTRUCTIONS
value is normal. This indicates that your kidneys are not being affected by your sugar and/or blood pressure.    8) Plan on repeating your labs in 2 months to check your thyroid and cholesterol levels.   I will send you a message through [facility] with your lab results.

## 2024-03-06 DIAGNOSIS — G40.209 LOCALIZATION-RELATED (FOCAL) (PARTIAL) SYMPTOMATIC EPILEPSY AND EPILEPTIC SYNDROMES WITH COMPLEX PARTIAL SEIZURES, NOT INTRACTABLE, WITHOUT STATUS EPILEPTICUS (HCC): Primary | ICD-10-CM

## 2024-03-06 RX ORDER — DIVALPROEX SODIUM 250 MG/1
TABLET, DELAYED RELEASE ORAL
Qty: 720 TABLET | Refills: 3 | Status: SHIPPED | OUTPATIENT
Start: 2024-03-06

## 2024-04-04 ENCOUNTER — OFFICE VISIT (OUTPATIENT)
Age: 59
End: 2024-04-04
Payer: COMMERCIAL

## 2024-04-04 VITALS
WEIGHT: 239 LBS | BODY MASS INDEX: 38.41 KG/M2 | DIASTOLIC BLOOD PRESSURE: 86 MMHG | TEMPERATURE: 97.7 F | OXYGEN SATURATION: 97 % | SYSTOLIC BLOOD PRESSURE: 132 MMHG | RESPIRATION RATE: 18 BRPM | HEIGHT: 66 IN | HEART RATE: 86 BPM

## 2024-04-04 DIAGNOSIS — G40.209 COMPLEX PARTIAL SEIZURE EVOLVING TO GENERALIZED SEIZURE (HCC): ICD-10-CM

## 2024-04-04 DIAGNOSIS — E03.9 HYPOTHYROIDISM (ACQUIRED): ICD-10-CM

## 2024-04-04 DIAGNOSIS — G40.B09 NONINTRACTABLE JUVENILE MYOCLONIC EPILEPSY WITHOUT STATUS EPILEPTICUS (HCC): ICD-10-CM

## 2024-04-04 DIAGNOSIS — Z51.81 THERAPEUTIC DRUG MONITORING: ICD-10-CM

## 2024-04-04 DIAGNOSIS — I10 ESSENTIAL HYPERTENSION, BENIGN: Primary | ICD-10-CM

## 2024-04-04 DIAGNOSIS — I10 ESSENTIAL HYPERTENSION, BENIGN: ICD-10-CM

## 2024-04-04 DIAGNOSIS — G25.0 BENIGN ESSENTIAL TREMOR SYNDROME: ICD-10-CM

## 2024-04-04 PROCEDURE — 3079F DIAST BP 80-89 MM HG: CPT | Performed by: PSYCHIATRY & NEUROLOGY

## 2024-04-04 PROCEDURE — 3075F SYST BP GE 130 - 139MM HG: CPT | Performed by: PSYCHIATRY & NEUROLOGY

## 2024-04-04 PROCEDURE — 99214 OFFICE O/P EST MOD 30 MIN: CPT | Performed by: PSYCHIATRY & NEUROLOGY

## 2024-04-04 RX ORDER — CLONAZEPAM 0.5 MG/1
0.5 TABLET ORAL 2 TIMES DAILY PRN
Qty: 60 TABLET | Refills: 5 | Status: SHIPPED | OUTPATIENT
Start: 2024-04-04 | End: 2024-10-04

## 2024-04-04 ASSESSMENT — PATIENT HEALTH QUESTIONNAIRE - PHQ9
SUM OF ALL RESPONSES TO PHQ QUESTIONS 1-9: 0
2. FEELING DOWN, DEPRESSED OR HOPELESS: NOT AT ALL
SUM OF ALL RESPONSES TO PHQ9 QUESTIONS 1 & 2: 0
SUM OF ALL RESPONSES TO PHQ QUESTIONS 1-9: 0
1. LITTLE INTEREST OR PLEASURE IN DOING THINGS: NOT AT ALL

## 2024-04-04 NOTE — PROGRESS NOTES
Consult  REFERRED BY:  John Paul Dalal MD    CHIEF COMPLAINT: Seizures and increasing tremors      Subjective:     Akosua Cross is a 58 y.o. right-handed  female we are seeing in follow-up for new problem of increasing tremors in her hands, that seems to be getting a little more prominent and worse, and she seems to have essential tremor that is probably aggravated by her Depakote that she takes for her juvenile myoclonic epilepsy, and her levels run kind of high, so we will recheck them again because she has had over a 35 pound weight loss recently, and her levels may be making the tremors worse.  She takes Depakote for her CHIDI, but has not had a seizure in 36 years on this medication, and was previously on Dilantin with good control but we switched her off of that because of her osteoporosis and she has done well on Depakote since.  She is currently taking 250 mg tablets, taking 3 in the morning 2 at lunch and 3 at night, and we could consider cutting down her medication some because she probably needs less because she is doing so well on the seizure medications at 35 years, and having more difficulty with her tremor.  She has had no other side effect on the medication and otherwise is doing well.  Her last level was 74 1 year ago and was in the 80s for the 2 years prior to that.  She has not had an EEG recently but her prior EEGs were consistent with CHIDI, and her imaging of the brain and been done in the past but not recently, and her MRI and CT were unremarkable.  She has had no other new neurological problems or symptoms in the meantime.  No other new medical problems and she is advised to continue taking her vitamin D and multivitamin every day and stay physically mentally active, try to exercise half an hour every day and eat a Mediterranean type diet.  She is prediabetic, under the care of Dr. De La Garza for her diabetes and thyroid and is losing weight and trying to avoid

## 2024-04-04 NOTE — PROGRESS NOTES
Chief Complaint   Patient presents with    Seizures     Follow up - has not had one in 36 years      1. Have you been to the ER, urgent care clinic since your last visit?  Hospitalized since your last visit? No     2. Have you seen or consulted any other health care providers outside of the [facility] since your last visit?  Include any pap smears or colon screening.  No

## 2024-04-06 LAB — VALPROATE SERPL-MCNC: 94 UG/ML (ref 50–100)

## 2024-04-29 DIAGNOSIS — E78.5 HYPERLIPIDEMIA LDL GOAL <100: ICD-10-CM

## 2024-04-29 DIAGNOSIS — R73.01 IMPAIRED FASTING GLUCOSE: ICD-10-CM

## 2024-04-29 DIAGNOSIS — M85.80 OSTEOPENIA, UNSPECIFIED LOCATION: ICD-10-CM

## 2024-04-29 DIAGNOSIS — I10 ESSENTIAL (PRIMARY) HYPERTENSION: ICD-10-CM

## 2024-04-29 DIAGNOSIS — E03.9 HYPOTHYROIDISM (ACQUIRED): ICD-10-CM

## 2024-04-29 DIAGNOSIS — R82.994 HYPERCALCIURIA: ICD-10-CM

## 2024-04-30 LAB
ALT SERPL-CCNC: 19 IU/L (ref 0–32)
AST SERPL-CCNC: 28 IU/L (ref 0–40)
CHOLEST SERPL-MCNC: 181 MG/DL (ref 100–199)
HDLC SERPL-MCNC: 37 MG/DL
IMP & REVIEW OF LAB RESULTS: NORMAL
LDLC SERPL CALC-MCNC: 118 MG/DL (ref 0–99)
T4 FREE SERPL-MCNC: 1.67 NG/DL (ref 0.82–1.77)
TRIGL SERPL-MCNC: 142 MG/DL (ref 0–149)
TSH SERPL DL<=0.005 MIU/L-ACNC: 0.6 UIU/ML (ref 0.45–4.5)
VLDLC SERPL CALC-MCNC: 26 MG/DL (ref 5–40)

## 2024-08-16 DIAGNOSIS — E78.5 HYPERLIPIDEMIA LDL GOAL <100: ICD-10-CM

## 2024-08-16 DIAGNOSIS — I10 ESSENTIAL (PRIMARY) HYPERTENSION: ICD-10-CM

## 2024-08-16 DIAGNOSIS — R82.994 HYPERCALCIURIA: ICD-10-CM

## 2024-08-16 DIAGNOSIS — E03.9 HYPOTHYROIDISM (ACQUIRED): ICD-10-CM

## 2024-08-16 DIAGNOSIS — M85.80 OSTEOPENIA, UNSPECIFIED LOCATION: ICD-10-CM

## 2024-08-16 DIAGNOSIS — R73.01 IMPAIRED FASTING GLUCOSE: ICD-10-CM

## 2024-08-21 LAB
ALBUMIN SERPL-MCNC: 4.2 G/DL (ref 3.8–4.9)
ALP SERPL-CCNC: 50 IU/L (ref 44–121)
ALT SERPL-CCNC: 16 IU/L (ref 0–32)
AST SERPL-CCNC: 25 IU/L (ref 0–40)
BILIRUB SERPL-MCNC: 0.4 MG/DL (ref 0–1.2)
BUN SERPL-MCNC: 15 MG/DL (ref 8–27)
BUN/CREAT SERPL: 34 (ref 12–28)
CALCIUM SERPL-MCNC: 10.2 MG/DL (ref 8.7–10.3)
CHLORIDE SERPL-SCNC: 102 MMOL/L (ref 96–106)
CHOLEST SERPL-MCNC: 196 MG/DL (ref 100–199)
CO2 SERPL-SCNC: 23 MMOL/L (ref 20–29)
CREAT SERPL-MCNC: 0.44 MG/DL (ref 0.57–1)
EGFRCR SERPLBLD CKD-EPI 2021: 111 ML/MIN/1.73
GLOBULIN SER CALC-MCNC: 2.8 G/DL (ref 1.5–4.5)
GLUCOSE SERPL-MCNC: 98 MG/DL (ref 70–99)
HBA1C MFR BLD: 5.5 % (ref 4.8–5.6)
HDLC SERPL-MCNC: 44 MG/DL
IMP & REVIEW OF LAB RESULTS: NORMAL
LDLC SERPL CALC-MCNC: 128 MG/DL (ref 0–99)
POTASSIUM SERPL-SCNC: 4.6 MMOL/L (ref 3.5–5.2)
PROT SERPL-MCNC: 7 G/DL (ref 6–8.5)
SODIUM SERPL-SCNC: 140 MMOL/L (ref 134–144)
T4 FREE SERPL-MCNC: 1.56 NG/DL (ref 0.82–1.77)
TRIGL SERPL-MCNC: 135 MG/DL (ref 0–149)
TSH SERPL DL<=0.005 MIU/L-ACNC: 0.6 UIU/ML (ref 0.45–4.5)
VLDLC SERPL CALC-MCNC: 24 MG/DL (ref 5–40)

## 2024-08-30 ENCOUNTER — OFFICE VISIT (OUTPATIENT)
Age: 59
End: 2024-08-30
Payer: COMMERCIAL

## 2024-08-30 ENCOUNTER — TELEPHONE (OUTPATIENT)
Age: 59
End: 2024-08-30

## 2024-08-30 VITALS
HEART RATE: 92 BPM | HEIGHT: 66 IN | WEIGHT: 228.8 LBS | SYSTOLIC BLOOD PRESSURE: 142 MMHG | BODY MASS INDEX: 36.77 KG/M2 | DIASTOLIC BLOOD PRESSURE: 66 MMHG

## 2024-08-30 DIAGNOSIS — Z13.820 ENCOUNTER FOR OSTEOPOROSIS SCREENING IN ASYMPTOMATIC POSTMENOPAUSAL PATIENT: ICD-10-CM

## 2024-08-30 DIAGNOSIS — I10 ESSENTIAL (PRIMARY) HYPERTENSION: ICD-10-CM

## 2024-08-30 DIAGNOSIS — E03.9 HYPOTHYROIDISM (ACQUIRED): ICD-10-CM

## 2024-08-30 DIAGNOSIS — R73.01 IMPAIRED FASTING GLUCOSE: ICD-10-CM

## 2024-08-30 DIAGNOSIS — R82.994 HYPERCALCIURIA: ICD-10-CM

## 2024-08-30 DIAGNOSIS — M85.80 OSTEOPENIA, UNSPECIFIED LOCATION: Primary | ICD-10-CM

## 2024-08-30 DIAGNOSIS — Z78.0 ENCOUNTER FOR OSTEOPOROSIS SCREENING IN ASYMPTOMATIC POSTMENOPAUSAL PATIENT: ICD-10-CM

## 2024-08-30 DIAGNOSIS — E78.5 HYPERLIPIDEMIA LDL GOAL <100: ICD-10-CM

## 2024-08-30 PROCEDURE — 3078F DIAST BP <80 MM HG: CPT | Performed by: INTERNAL MEDICINE

## 2024-08-30 PROCEDURE — 3077F SYST BP >= 140 MM HG: CPT | Performed by: INTERNAL MEDICINE

## 2024-08-30 PROCEDURE — 99214 OFFICE O/P EST MOD 30 MIN: CPT | Performed by: INTERNAL MEDICINE

## 2024-08-30 NOTE — TELEPHONE ENCOUNTER
Pt scheduled for Wednesday 09/04/2024 at 9 AM at the [place] location as it was first available. Informed pt of date and time but she stated she can't make that appt. Gave pt the number [phone] to reschedule appt at her earliest convenience. Pt verbalized understanding. Order faxed and confirmation received.

## 2024-08-30 NOTE — PATIENT INSTRUCTIONS
1) Your weight is down 45 lbs on our scales over the past year and your Hemoglobin A1c (3 month test of blood sugar) is now normal at 5.5%.  Keep up the good work!    2) BUN and creatinine are markers of kidney function.  Your values are normal.  Although your creatinine is low, I'm not concerned about this as I only worry if your value is high.  I'm not concerned by the BUN/creatinine ratio being high when the individual tests are normal so there are no issues with your kidneys at this time.    3) ALT and AST are markers of liver function.  Your values are normal.    4) Your TSH (thyroid test) is perfect so I will keep your dose the same of levoxyl 6.5 tabs/week.    5) Your LDL (bad cholesterol) is still over 100 but we'll focus on changing from a keto diet to a Mediterranean diet to help further lower your cholesterol.  Keep the atorvastatin at 20 mg daily.    6) Start monitoring blood pressure about 2-3 times per week at alternating times either in the morning or evening after resting for 5 minutes and sitting upright in a chair with your arm at heart level. Please let me know if you are having readings over 140 on the top number or 90 on the bottom number.    7) I will have Liliana arrange a repeat bone density scan at [facility] and I'll be in touch with the results.

## 2024-08-30 NOTE — PROGRESS NOTES
Chief Complaint   Patient presents with    Thyroid Problem     PCP and pharmacy confirmed     History of Present Illness: Akosua Cross is a 59 y.o. female here for follow up of thyroid.  Weight down 21 lbs since last visit in 2/24.  Has been taking levoxyl 1 tab on Sun-Fri and 1/2 tab on Sat and has only missed one dose but doubled up the next day.  Has been following a keto diet and eating more butter and red meat and eggs and plans to change to a Mediterranean diet to help lower her LDL as she has been compliant with her atorva 20 mg daily.  Compliant with BP regimen and BP up today due to being nervous about her high cholesterol. Compliant with vitamin D.      Current Outpatient Medications   Medication Sig    clonazePAM (KLONOPIN) 0.5 MG tablet Take 1 tablet by mouth 2 times daily as needed for Anxiety for up to 183 days. Max Daily Amount: 1 mg    divalproex (DEPAKOTE) 250 MG DR tablet TAKE 3 TABLETS IN THE MORNING, 2 TABLETS AT LUNCH TIME, AND 3 TABLETS AT NIGHT TIME FOR CONVULSIVE SEIZURES    atorvastatin (LIPITOR) 20 MG tablet Take 1 tablet by mouth nightly Replaces pravastatin for cholesterol    LEVOXYL 137 MCG tablet TAKE 1 TABLET DAILY 6 DAYS A WEEK AND 1/2 TAB ONE DAY A WEEK--Dose change 02/29/24--updated med list--did not send prescription to the pharmacy    hydroCHLOROthiazide (HYDRODIURIL) 12.5 MG tablet TAKE 1 TABLET DAILY (REPLACES LOSARTAN-HCTZ 100/12.5 MG TABLETS)    losartan (COZAAR) 100 MG tablet TAKE 1 TABLET DAILY (REPLACES LOSARTAN-HCTZ 100/12.5 MG TABLETS)    CRANBERRY PO Take by mouth 2 times daily    vitamin D 25 MCG (1000 UT) CAPS Take by mouth daily     No current facility-administered medications for this visit.     Allergies   Allergen Reactions    Lisinopril Cough    Nitrofurantoin Other (See Comments)     headaches    Rosuvastatin Myalgia       Review of Systems: PER HPI    Physical Examination:  Blood pressure (!) 142/66, pulse 92, height 1.676 m (5' 6\"), weight 103.8 kg (228

## 2024-08-30 NOTE — TELEPHONE ENCOUNTER
Please call [facility] to arrange a repeat bone density scan and fax the order on the printer.  Thanks!

## 2024-10-11 DIAGNOSIS — G40.B09 NONINTRACTABLE JUVENILE MYOCLONIC EPILEPSY WITHOUT STATUS EPILEPTICUS (HCC): ICD-10-CM

## 2024-10-11 DIAGNOSIS — E03.9 HYPOTHYROIDISM (ACQUIRED): ICD-10-CM

## 2024-10-11 DIAGNOSIS — I10 ESSENTIAL HYPERTENSION, BENIGN: ICD-10-CM

## 2024-10-11 DIAGNOSIS — G40.209 COMPLEX PARTIAL SEIZURE EVOLVING TO GENERALIZED SEIZURE (HCC): ICD-10-CM

## 2024-10-11 DIAGNOSIS — Z51.81 THERAPEUTIC DRUG MONITORING: ICD-10-CM

## 2024-10-11 RX ORDER — ATORVASTATIN CALCIUM 20 MG/1
20 TABLET, FILM COATED ORAL DAILY
Qty: 90 TABLET | Refills: 3 | Status: SHIPPED | OUTPATIENT
Start: 2024-10-11

## 2024-10-11 RX ORDER — CLONAZEPAM 0.5 MG/1
0.5 TABLET ORAL 2 TIMES DAILY PRN
Qty: 60 TABLET | Refills: 5 | Status: SHIPPED | OUTPATIENT
Start: 2024-10-11 | End: 2025-04-13

## 2024-11-29 RX ORDER — LOSARTAN POTASSIUM 100 MG/1
TABLET ORAL
Qty: 90 TABLET | Refills: 3 | Status: SHIPPED | OUTPATIENT
Start: 2024-11-29

## 2024-11-29 RX ORDER — LEVOTHYROXINE SODIUM 137 UG/1
TABLET ORAL
Qty: 90 TABLET | Refills: 3 | Status: SHIPPED | OUTPATIENT
Start: 2024-11-29

## 2024-11-29 RX ORDER — HYDROCHLOROTHIAZIDE 12.5 MG/1
TABLET ORAL
Qty: 90 TABLET | Refills: 3 | Status: SHIPPED | OUTPATIENT
Start: 2024-11-29

## 2024-12-31 RX ORDER — ATORVASTATIN CALCIUM 20 MG/1
TABLET, FILM COATED ORAL
Qty: 90 TABLET | Refills: 3 | Status: SHIPPED | OUTPATIENT
Start: 2024-12-31

## 2025-02-14 DIAGNOSIS — R73.01 IMPAIRED FASTING GLUCOSE: ICD-10-CM

## 2025-02-14 DIAGNOSIS — M85.80 OSTEOPENIA, UNSPECIFIED LOCATION: ICD-10-CM

## 2025-02-14 DIAGNOSIS — E03.9 HYPOTHYROIDISM (ACQUIRED): ICD-10-CM

## 2025-02-14 DIAGNOSIS — I10 ESSENTIAL (PRIMARY) HYPERTENSION: ICD-10-CM

## 2025-02-14 DIAGNOSIS — E78.5 HYPERLIPIDEMIA LDL GOAL <100: ICD-10-CM

## 2025-02-14 DIAGNOSIS — R82.994 HYPERCALCIURIA: ICD-10-CM

## 2025-02-18 ENCOUNTER — TELEPHONE (OUTPATIENT)
Age: 60
End: 2025-02-18

## 2025-02-18 NOTE — TELEPHONE ENCOUNTER
Pt LVM stating her and her  are sick and they will not make the appt on 25. She stated they have rescheduled for 2025. She asked if lab orders on file will be  and will they need new orders? Pt stated she can be called or messaged on [employer].

## 2025-02-19 NOTE — TELEPHONE ENCOUNTER
Called and spoke with pt.  She states she and her  have been very ill and weren't sure if they would be able to get their labs drawn before their visit on 2/28/25 and that's why they both cancelled.  I told her that it's fine if they want to wait to be seen until June as they are both stable and their orders will still be fine to use at that time but if they would like to be seen sooner, please go and have your labs drawn at your earliest convenience using the order on file under my name at [facility].  Once the results are back, I will offer you a visit in the 1-2 weeks after they return when I have a cancellation.  I may not be able to see both of them back to back but can still try to squeeze them in when I have a cancellation.  She will discuss with her  about whether they will choose to get their labs drawn now when they feel better or just wait until June.  she voiced understanding of this plan.

## 2025-02-27 ENCOUNTER — PATIENT MESSAGE (OUTPATIENT)
Age: 60
End: 2025-02-27

## 2025-02-28 DIAGNOSIS — G40.209 LOCALIZATION-RELATED (FOCAL) (PARTIAL) SYMPTOMATIC EPILEPSY AND EPILEPTIC SYNDROMES WITH COMPLEX PARTIAL SEIZURES, NOT INTRACTABLE, WITHOUT STATUS EPILEPTICUS (HCC): ICD-10-CM

## 2025-02-28 RX ORDER — DIVALPROEX SODIUM 250 MG/1
TABLET, DELAYED RELEASE ORAL
Qty: 720 TABLET | Refills: 3 | Status: SHIPPED | OUTPATIENT
Start: 2025-02-28

## 2025-02-28 NOTE — TELEPHONE ENCOUNTER
Dr. Mock, would you please send me a prescription [facility]akote [facility]? Thank you.     Last office visit: 04/04/2024  Next office visit: 04/07/2025  Last med refill:  03/06/2024

## 2025-03-03 DIAGNOSIS — M85.80 OSTEOPENIA, UNSPECIFIED LOCATION: ICD-10-CM

## 2025-03-03 DIAGNOSIS — Z78.0 ENCOUNTER FOR OSTEOPOROSIS SCREENING IN ASYMPTOMATIC POSTMENOPAUSAL PATIENT: ICD-10-CM

## 2025-03-03 DIAGNOSIS — Z13.820 ENCOUNTER FOR OSTEOPOROSIS SCREENING IN ASYMPTOMATIC POSTMENOPAUSAL PATIENT: ICD-10-CM

## 2025-03-04 DIAGNOSIS — G40.209 LOCALIZATION-RELATED (FOCAL) (PARTIAL) SYMPTOMATIC EPILEPSY AND EPILEPTIC SYNDROMES WITH COMPLEX PARTIAL SEIZURES, NOT INTRACTABLE, WITHOUT STATUS EPILEPTICUS (HCC): ICD-10-CM

## 2025-03-04 RX ORDER — DIVALPROEX SODIUM 250 MG/1
TABLET, DELAYED RELEASE ORAL
Qty: 720 TABLET | Refills: 3 | OUTPATIENT
Start: 2025-03-04

## 2025-03-04 NOTE — TELEPHONE ENCOUNTER
Last office visit: 04/04/2024  Next office visit: 04/07/2025  Last med refill: 02/28/2025 720 tabs with 3 refills

## 2025-04-07 ENCOUNTER — OFFICE VISIT (OUTPATIENT)
Age: 60
End: 2025-04-07
Payer: COMMERCIAL

## 2025-04-07 VITALS
RESPIRATION RATE: 18 BRPM | SYSTOLIC BLOOD PRESSURE: 110 MMHG | WEIGHT: 243 LBS | BODY MASS INDEX: 39.05 KG/M2 | HEART RATE: 90 BPM | OXYGEN SATURATION: 97 % | TEMPERATURE: 97.4 F | DIASTOLIC BLOOD PRESSURE: 78 MMHG | HEIGHT: 66 IN

## 2025-04-07 DIAGNOSIS — G40.209 LOCALIZATION-RELATED (FOCAL) (PARTIAL) SYMPTOMATIC EPILEPSY AND EPILEPTIC SYNDROMES WITH COMPLEX PARTIAL SEIZURES, NOT INTRACTABLE, WITHOUT STATUS EPILEPTICUS: ICD-10-CM

## 2025-04-07 DIAGNOSIS — G40.B09 NONINTRACTABLE JUVENILE MYOCLONIC EPILEPSY WITHOUT STATUS EPILEPTICUS (HCC): ICD-10-CM

## 2025-04-07 DIAGNOSIS — G25.0 BENIGN ESSENTIAL TREMOR SYNDROME: Primary | ICD-10-CM

## 2025-04-07 DIAGNOSIS — Z51.81 THERAPEUTIC DRUG MONITORING: ICD-10-CM

## 2025-04-07 PROCEDURE — 3074F SYST BP LT 130 MM HG: CPT | Performed by: PSYCHIATRY & NEUROLOGY

## 2025-04-07 PROCEDURE — 3078F DIAST BP <80 MM HG: CPT | Performed by: PSYCHIATRY & NEUROLOGY

## 2025-04-07 PROCEDURE — 99214 OFFICE O/P EST MOD 30 MIN: CPT | Performed by: PSYCHIATRY & NEUROLOGY

## 2025-04-07 RX ORDER — DIVALPROEX SODIUM 250 MG/1
TABLET, DELAYED RELEASE ORAL
Qty: 630 TABLET | Refills: 3 | Status: SHIPPED | OUTPATIENT
Start: 2025-04-07

## 2025-04-07 ASSESSMENT — PATIENT HEALTH QUESTIONNAIRE - PHQ9
SUM OF ALL RESPONSES TO PHQ QUESTIONS 1-9: 0
2. FEELING DOWN, DEPRESSED OR HOPELESS: NOT AT ALL
1. LITTLE INTEREST OR PLEASURE IN DOING THINGS: NOT AT ALL
SUM OF ALL RESPONSES TO PHQ QUESTIONS 1-9: 0

## 2025-04-07 NOTE — PROGRESS NOTES
seizures, not intractable, without status epilepticus  divalproex (DEPAKOTE) 250 MG DR tablet    Valproic Acid Level, Total        Active Problems:    * No active hospital problems. *  Resolved Problems:    * No resolved hospital problems. *      Plan:     Patient with continued weight gain and tremors, because she has not had a seizure in 37 years we discussed the options of coming down on her medication we will cut her down to 1 pill in the morning from 3 a day in the morning to 2 in the morning and continue the 2 at lunch and 3 at bedtime for now.  She will call us if there is any change or problem with that.  Patient with increasing essential tremor, most likely this is secondary to her medication of the Depakote on top of essential tremor syndrome, we will reduce her Depakote dose and recheck her levels, hopefully that will help with the weight also.    Patient doing well on current medication we encouraged her to make sure she takes a multivitamin or vitamin D every day and eat a Mediterranean type diet and continue working with Dr. De La Garza on controlling her blood sugars and losing weight which will also help improve her overall neurologic function.  Depakote level will be checked today, and her medications were just refilled, and she will continue those for now, we did refill her Klonopin that she takes as needed for anxiety and did advise her again to keep it to a minimum because it is a controlled substance and can be abused but she never has abused it in the past.    32-minute spent with the patient going over all this in detail, she agrees with plans of therapy as above and we will see her again in 1 years time or earlier if need be all goes well.    Signed By: Reese Mock MD     April 7, 2025       CC: John Paul Dalal MD  FAX: [phone]

## 2025-05-27 LAB — HBA1C MFR BLD: 12.2 % (ref 4.8–5.6)

## 2025-05-28 LAB
25(OH)D3+25(OH)D2 SERPL-MCNC: 37.6 NG/ML (ref 30–100)
ALBUMIN SERPL-MCNC: 4.2 G/DL (ref 3.9–4.9)
ALBUMIN/CREAT UR: 6 MG/G CREAT (ref 0–29)
ALP SERPL-CCNC: 65 IU/L (ref 44–121)
ALT SERPL-CCNC: 14 IU/L (ref 0–32)
AST SERPL-CCNC: 22 IU/L (ref 0–40)
BILIRUB SERPL-MCNC: 0.5 MG/DL (ref 0–1.2)
BUN SERPL-MCNC: 14 MG/DL (ref 8–27)
BUN/CREAT SERPL: 24 (ref 12–28)
CALCIUM SERPL-MCNC: 10.1 MG/DL (ref 8.7–10.3)
CHLORIDE SERPL-SCNC: 97 MMOL/L (ref 96–106)
CHOLEST SERPL-MCNC: 203 MG/DL (ref 100–199)
CO2 SERPL-SCNC: 23 MMOL/L (ref 20–29)
CREAT SERPL-MCNC: 0.58 MG/DL (ref 0.57–1)
CREAT UR-MCNC: 72.4 MG/DL
EGFRCR SERPLBLD CKD-EPI 2021: 103 ML/MIN/1.73
GLOBULIN SER CALC-MCNC: 3.1 G/DL (ref 1.5–4.5)
GLUCOSE SERPL-MCNC: 313 MG/DL (ref 70–99)
HDLC SERPL-MCNC: 34 MG/DL
IMP & REVIEW OF LAB RESULTS: NORMAL
LDLC SERPL CALC-MCNC: 127 MG/DL (ref 0–99)
Lab: NORMAL
MICROALBUMIN UR-MCNC: 4.4 UG/ML
POTASSIUM SERPL-SCNC: 4.4 MMOL/L (ref 3.5–5.2)
PROT SERPL-MCNC: 7.3 G/DL (ref 6–8.5)
SODIUM SERPL-SCNC: 136 MMOL/L (ref 134–144)
T4 FREE SERPL-MCNC: 1.61 NG/DL (ref 0.82–1.77)
TRIGL SERPL-MCNC: 235 MG/DL (ref 0–149)
TSH SERPL DL<=0.005 MIU/L-ACNC: 2 UIU/ML (ref 0.45–4.5)
VLDLC SERPL CALC-MCNC: 42 MG/DL (ref 5–40)

## 2025-05-30 LAB
VALPROATE FREE SERPL-MCNC: 28.1 UG/ML (ref 6–22)
VALPROATE SERPL-MCNC: 83 UG/ML (ref 50–100)

## 2025-06-30 ENCOUNTER — OFFICE VISIT (OUTPATIENT)
Age: 60
End: 2025-06-30
Payer: COMMERCIAL

## 2025-06-30 ENCOUNTER — RESULTS FOLLOW-UP (OUTPATIENT)
Age: 60
End: 2025-06-30

## 2025-06-30 VITALS
DIASTOLIC BLOOD PRESSURE: 64 MMHG | HEART RATE: 92 BPM | BODY MASS INDEX: 36.8 KG/M2 | HEIGHT: 66 IN | WEIGHT: 229 LBS | SYSTOLIC BLOOD PRESSURE: 120 MMHG

## 2025-06-30 DIAGNOSIS — E11.65 TYPE 2 DIABETES MELLITUS WITH HYPERGLYCEMIA, WITHOUT LONG-TERM CURRENT USE OF INSULIN (HCC): ICD-10-CM

## 2025-06-30 DIAGNOSIS — E78.5 HYPERLIPIDEMIA LDL GOAL <100: ICD-10-CM

## 2025-06-30 DIAGNOSIS — I10 ESSENTIAL (PRIMARY) HYPERTENSION: ICD-10-CM

## 2025-06-30 DIAGNOSIS — M85.80 OSTEOPENIA, UNSPECIFIED LOCATION: Primary | ICD-10-CM

## 2025-06-30 DIAGNOSIS — R82.994 HYPERCALCIURIA: ICD-10-CM

## 2025-06-30 DIAGNOSIS — E03.9 HYPOTHYROIDISM (ACQUIRED): ICD-10-CM

## 2025-06-30 PROCEDURE — 3046F HEMOGLOBIN A1C LEVEL >9.0%: CPT | Performed by: INTERNAL MEDICINE

## 2025-06-30 PROCEDURE — 99214 OFFICE O/P EST MOD 30 MIN: CPT | Performed by: INTERNAL MEDICINE

## 2025-06-30 PROCEDURE — 3074F SYST BP LT 130 MM HG: CPT | Performed by: INTERNAL MEDICINE

## 2025-06-30 PROCEDURE — 3078F DIAST BP <80 MM HG: CPT | Performed by: INTERNAL MEDICINE

## 2025-06-30 RX ORDER — LEVOTHYROXINE SODIUM 137 UG/1
TABLET ORAL
Qty: 90 TABLET | Refills: 3 | Status: SHIPPED | OUTPATIENT
Start: 2025-06-30

## 2025-06-30 NOTE — PATIENT INSTRUCTIONS
1) TSH is a thyroid test.  Your level is 2 which is normal and at goal of 0.45-2.0 but previously you were under 1.  The TSH goes opposite of your thyroid dose and suggests your dose of levoxyl is not quite enough.  I will increase your dose to 1 tab 7 days a week and have sent a new prescription to your local pharmacy.  Tonight take 1.5 tabs to give a boost.      2) Check blood sugars once daily either fasting (goal is ) or 2 hours after a meal (goal is less than 180).  Alternate one meal a day to check (example: one day check after breakfast, one day after lunch, one day after dinner).  Write down what you ate if your blood sugar is more than 180 so you can know to cut back or cut out this food from your diet.    3) Try not to eat more than 45 grams of carbs per meal (1 palm/fist sized serving = 30 grams; carbs are potatoes, rice, pasta, bread, fruit, corn, peas, cereal, desserts)    4) Do your best to avoid all sweetened beverages and stick to water, unsweet tea, diet soda, or crystal light as options instead.  Don't drink more than 2 of the 12oz artificially sweetened drinks per day as these can increase hunger and make it more difficult to lose weight.    5) Your cholesterol is likely high from diet and should come down with lower A1c.       Orbicularis Oris Muscle Flap Text: The defect edges were debeveled with a #15 scalpel blade.  Given that the defect affected the competency of the oral sphincter an orbicularis oris muscle flap was deemed most appropriate to restore this competency and normal muscle function.  Using a sterile surgical marker, an appropriate flap was drawn incorporating the defect. The area thus outlined was incised with a #15 scalpel blade.

## 2025-06-30 NOTE — PROGRESS NOTES
Chief Complaint   Patient presents with    Follow-up     PCP and Pharmacy verified  Pt consents to NOY     History of Present Illness: Akosua Cross is a 60 y.o. female here for follow up of thyroid.  Weight up 1 lbs since last visit in 8/24.      History of Present Illness  The patient is a 60-year-old female who presents for follow-up of diabetes and thyroid.    She has been managing her thyroid condition with Levoxyl 137, taking 6.5 tablets per week. She takes half a tablet on Saturday and a whole tablet from Sunday through Friday. She reports occasionally missing doses but compensates by taking two tablets the following day. Typically, she administers the medication between 3:00 and 4:00 AM, ensuring it is taken on an empty stomach with only a small amount of water. She reports no use of new acid reflux medications such as Tums, Pepcid, or Prilosec.    In February 2025, she experienced a viral infection that lasted for several months. During this period, she did not seek medical attention or take any antibiotics or steroids. In April 2025, she had a dental procedure involving a tooth cap, which resulted in severe pain. She was informed that this could be due to sinus issues related to high pollen counts. During this time, she did not adhere to her diet, consuming ice cream and regular ginger ale, which she was unaware could elevate her blood sugar levels. She also reported increased thirst, leading her to consume approximately four glasses of ginger ale daily. Her diet also included potato chips and Cheetos. She recently purchased a glucose meter and recorded a fasting glucose level of 153 this morning. Over the past month, she has been consuming between 6 and 20 carbohydrates daily. She consumes one cup of coffee with one packet of Splenda each morning. She has not received any steroid injections or oral steroids in the past three months.    She continues to take hydrochlorothiazide 12.5 mg daily for her